# Patient Record
(demographics unavailable — no encounter records)

---

## 2017-01-13 PROBLEM — J32.0 RIGHT MAXILLARY SINUSITIS: Status: ACTIVE | Noted: 2017-01-13

## 2017-01-13 NOTE — PROGRESS NOTES
Tru Watkins is a 79year old female. HPI:   Patient complains of right ear pain for approximately 24-36 hours. She also complains of right maxillary sinus pain. She does complain of head congestion.     Current Outpatient Prescriptions:  Famotidine ( date: 02/15/1984    Smokeless Status: Never Used                        Comment: smoked for 14years     Alcohol Use: Yes                Comment: social  1-2 drinks a week       REVIEW OF SYSTEMS:   GENERAL HEALTH: feels well otherwise  SKIN: denies any unu

## 2017-02-02 NOTE — PROGRESS NOTES
Luis Pepe is a 79year old female. CHIEF COMPLAINT:   Burning with urination  HPI:   Patient presents with symptoms of UTI. Complaining of urinary frequency, urgency, dysuria, suprapubic pain. Onset 7 days ago. Denies back pain, fever, hematuria. GERD    • Esophageal reflux 6/29/2012   • History of blepharoplasty      upper lid w/ excessive skin   • H/O colonoscopy 01/01/10   • Routine Papanicolaou smear 2009   • Pulmonary embolism (HCC)       Social History:    Smoking Status: Former Smoker

## 2017-02-02 NOTE — PATIENT INSTRUCTIONS
Bladder Infection, Female (Adult)    Urine is normally doesn't have any bacteria in it. But bacteria can get into the urinary tract from the skin around the rectum. Or they can travel in the blood from elsewhere in the body.  Once they are in your urinary · Bowel incontinence  · Pregnancy  · Procedures such as having a catheter inserted  · Older age  · Not emptying your bladder. This can allow bacteria a chance to grow in your urine.   · Dehydration  · Constipation  · Sex  · Use of a diaphragm for birth cont · Avoid caffeine, alcohol, and spicy foods. These can irritate your bladder. · Urinate right after intercourse to flush out your bladder. · If you use birth control pills and have frequent bladder infections, discuss it with your doctor.   Follow-up care

## 2017-02-07 NOTE — TELEPHONE ENCOUNTER
Pt called in made an appt to see you Thurs 2/9 at 11am   s/p CTA yesterday, ordered by cards Dr. Ryanne Guaman  Was started on Xarelto d/t findings of acute PE RLL  She wants you to review her CTA for other findings   Thanks.

## 2017-02-09 NOTE — PROGRESS NOTES
Berenice Giles is a 79year old female. HPI:   Patient presents today to discuss results of recent chest CTA. CTA: CONCLUSION:     #1. Acute pulmonary embolus to the right lower lobe. #2. Mild emphysematous changes. #3. Hepatic steatosis.   #4Mel Harkins Furoate 220 MCG/INH Inhalation Aerosol Powder, Breath Activated Inhale 1 puff into the lungs daily.  Disp: 1 Inhaler Rfl: 0      Past Medical History   Diagnosis Date   • BACK PAIN    • CANCER      skin cancer   • GERD    • Esophageal reflux 6/29/2012   • H follow-up provider specified. 1. Pulmonary embolism on right [facility])  PE to right lower lobe of lung per CTA. Discussed CTA results with the patient. Patient to continue Xarelto.   Discussed with patient that she may need lifetime anticoagulation given th

## 2017-03-09 NOTE — TELEPHONE ENCOUNTER
Message left for patient to call our office back. See note below from Dr. Michael Dubose:        \"Please call. Pt on my schedule for PE. Has seen me on 2016 but now seeing Dr. Michelle Antoine for DCIS.  I am happy to see her but Dr. Jose Wolf is hematologist too and I don'

## 2017-03-10 NOTE — PROGRESS NOTES
[facility] Progress Note      Patient Name:  Adam Rios  YOB: 1946  Medical Record Number:  [id number]    Date of visit: 3/10/2017    CHIEF COMPLAINT: recurrent PE.  HPI:     79year old female seen initially at [facility] 2/16/16 aft ALLERGIES:    Propofol                Hallucinations    Comment:EMUL  Vancomycin              Rash, Itching  Adhesive Tape             Ceftin                  Rash    Comment:TABS  Cefuroxime Axetil         Latex                       Comment:Patient s --  Do Not Use - Resp Rate: --  SpO2: --    PS:  ECO    PHYSICAL EXAM:    General: alert and oriented x 3, not in acute distress. HEENT: JEANIE, oropharynx  clear. Neck: supple. No JVD /adenopathy. CVS: S1S2, regular  Rhythm, no murmurs.    Lungs: Cl with her surgeon and will continue with mammograms. Continue rivaroxaban. Absolutely recommended against estrogen replacement. I had discussed it in 5/16 but patient had self resumed it. Is now discontinued it as of 1/17.   Candidate for indefinite an

## 2017-03-10 NOTE — PROGRESS NOTES
Pt here for MD f/u. Recently diagnosed with PE. Currently on Xarelto 20 mg daily. Pt reports DEL RIO and fatigue.      Education Record    Learner:  Patient    Disease / Diagnosis:    Barriers / Limitations:  None   Comments:    Method:  Discussion   Comments:

## 2017-04-06 NOTE — TELEPHONE ENCOUNTER
----- Message from Lindsay Gomez MD sent at 4/5/2017  5:13 PM CDT -----  Call, outside labs received, reviewed and ok

## 2017-04-07 NOTE — PROCEDURES
Luis Pepe is a 80-year-old  female who stands 5 feet 6 inches tall and weighs 147 pounds. She underwent standard pulmonary function testing on 4/6/17. She carries a diagnosis of dyspnea.   She has a 12-pack-year smoking history but stopped

## 2017-04-21 NOTE — PROGRESS NOTES
[facility] Progress Note      Patient Name:  Salvador Seth  YOB: 1946  Medical Record Number:  [id number]    Date of visit: 4/21/2017    CHIEF COMPLAINT: recurrent PE.    HPI:     79year old female seen initially at [facility] 2/16 afte joint pain  Dermatologic: no alopecia, rash, pruritis  : no hematuria, dysuria or frequency  Psych: no confusion or depression   Heme: no easy bruising or bleeding     ALLERGIES:    Propofol                Hallucinations    Comment:EMUL  Vancomycin (LASIX) 20 MG Oral Tab Take 20 mg by mouth See Admin Instructions. Uses 3-4 x a week  Disp:  Rfl:    Metoprolol Succinate (TOPROL XL) 50 MG Oral Tablet SR 24 Hr Take 50 mg by mouth nightly.  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 Subsequent lumpectomy revealed only 1 mm of residual DCIS that was ER/WY positive. Radiation was deferred. She decided against endocrine therapy.   Continue mammograms with surge

## 2017-04-21 NOTE — PROGRESS NOTES
Patient here for 6 week follow up. Patient has recurrent PE and is on xarelto and ecotrin. Patient says she has been SOB for about a year and just had a PFT done from her pulmonologist. Patient also has R Breast CA and is monitored closely with mammograms.

## 2017-05-19 PROBLEM — J40 BRONCHITIS: Status: ACTIVE | Noted: 2017-05-19

## 2017-05-19 NOTE — PROGRESS NOTES
HPI:   Leonardo Chandler is a 79year old female who presents for upper respiratory symptoms for  4  days. Patient reports sore throat only at the beginning of sx's, low grade fever, dry cough.   The patient did exhibit mild emphysema on prior CTA of the ches blepharoplasty      upper lid w/ excessive skin   • H/O colonoscopy 01/01/10   • Routine Papanicolaou smear 2009   • Pulmonary embolism [facility])           Past Surgical History    HYSTERECTOMY      DEXA,BONE DENSITY,AXIAL SKELETON  02/21/09    SINUS SURGERY nourished,in no apparent distress  SKIN: no rashes noted  EYES:conjunctiva are clear  HEENT: Throat appears slightly injected, right TM is normal, left TM is normal, there is PND visible.  There is mild right maxillary sinus tenderness  NECK: supple,no casa

## 2017-05-21 PROBLEM — J44.9 COPD, MILD (HCC): Status: ACTIVE | Noted: 2017-05-21

## 2017-05-26 NOTE — TELEPHONE ENCOUNTER
She should continue her Asmanex inhaler and we can refill the Z-Clint. Have her call next week if she is no better.

## 2017-05-26 NOTE — TELEPHONE ENCOUNTER
Please note below, pt states she is no longer improving and worried symptoms will return, denies fever. Please advise.

## 2017-05-26 NOTE — TELEPHONE ENCOUNTER
pt is still not feeling well she still has a cough she feeling it in her chest what should she do now advised it is a holiday weekend and doc left for an emergency please advise [phone]    Emily Reeder if she should get another antibiotic or wait it out or

## 2017-05-30 NOTE — TELEPHONE ENCOUNTER
Spoke with pt and given recommendations. Pt voiced understanding.   Ok to send Tarah Burnett as directed to her [facility].

## 2017-05-30 NOTE — TELEPHONE ENCOUNTER
Pt informed of Dr. Mary Mcdonnell recommendations and voiced understanding. Zpak refill pended, please approve. Thanks.

## 2017-06-26 PROBLEM — R05.3 PERSISTENT COUGH FOR 3 WEEKS OR LONGER: Status: ACTIVE | Noted: 2017-06-26

## 2017-06-26 NOTE — PROGRESS NOTES
Lorri Adan is a 79year old female. HPI:   Patient is here for follow-up of her recent upper respiratory infection. She has been out of her Asmanex for approximately 3 weeks. She has been having spasmodic coughing.   She does feel short of breath wi Powder, Breath Activated Inhale 1 puff into the lungs daily.  (Patient taking differently: Inhale 1 puff into the lungs daily as needed.  ) Disp: 1 Inhaler Rfl: 0      Past Medical History:   Diagnosis Date   • BACK PAIN    • CANCER     skin cancer   • Esop the lungs 2 (two) times daily. Albuterol Sulfate  (90 Base) MCG/ACT Inhalation Aero Soln 1 Inhaler 4      Sig: Inhale 2 puffs into the lungs every 6 (six) hours as needed for Wheezing.            Imaging & Consults:  None

## 2017-08-11 NOTE — PROCEDURES
[address]       Accredited by the [facility] (AASM)    PATIENT'S NAME:        Winford Osler ANN  ATTENDING PHYSICIAN:   Altaf Sharma M.D.   REFERRING PHYSICIAN:   Altaf Sharma M.D.  PA sedatives prior to sleep.     Dictated By Dominik Garrison M.D.  d: 08/11/2017 14:30:54  t: 08/11/2017 15:23:33  Job [id number]  RN/    cc: Yahir Shankar M.D.

## 2017-10-05 NOTE — TELEPHONE ENCOUNTER
Pt had pneumovax 23 10/17/2012. They are now requesting a pneumonia vaccine. I pended an order for the prevnar vaccine as that is what it looks like they need . Please review.

## 2017-10-05 NOTE — TELEPHONE ENCOUNTER
Please order pheumonia shot for pt coming for flu clinic 10/12 ok'ed at add with flu shot per [facility]

## 2017-10-15 NOTE — ED PROVIDER NOTES
Patient Seen in: [facility]    History   Patient presents with:  Lump Mass (integumentary)    Stated Complaint: RIGHT ARM LUMP/PAIN    HPI    CHIEF COMPLAINT: Bump on the right arm     HISTORY OF PRESENT ILLNESS: Patient is a 70 HYSTERECTOMY  1/6/17: LUMPECTOMY LEFT      Comment: DCIS-Lumpectomy @ [facility]  12/8/16: NEEDLE BIOPSY LEFT      Comment: DCIS  No date: OTHER SURGICAL HISTORY      Comment: birch and sling bladder sx, liposuction and                tummy tuck  No date: OTHER S reviewed  General Appearance: No acute distress  Neurological:  A&Ox3,  Gait normal.  Psychiatric: calm and cooperative  Respiratory: CTAB  Cardiovascular: RRR, S1/S2, no m/c/r  Musculoskeletal: Extremities are symmetrical, full range of motion  Skin:  war

## 2017-10-15 NOTE — ED INITIAL ASSESSMENT (HPI)
Pt noticed a lump to her rt antecubital space this am.  She states it has a dull ache  She got her flu and pneumonia shot 3 days prior

## 2017-10-20 NOTE — PROGRESS NOTES
[facility] Progress Note      Patient Name:  Rashaad Gonzales  YOB: 1946  Medical Record Number:  [id number]    Date of visit: 10/20/2017    CHIEF COMPLAINT: recurrent PE.    HPI:     70year old female seen initially at [address] [place] [address] 2/16 aft easy bruising or bleeding     ALLERGIES:    Vancomycin              Rash, Itching  Adhesive Tape             Ceftin                  Rash    Comment:TABS  Cefuroxime Axetil         Latex                       Comment:Patient states no latex catheters, no l Suspension 2 sprays by Each Nare route daily. Disp: 3 Bottle Rfl: 3   Vitamin Mixture (MICHAEL-C OR) Take 1,000 mg by mouth daily. Disp:  Rfl:    Dicyclomine HCl 10 MG Oral Cap Take 10 mg by mouth as needed.    Disp:  Rfl:    Furosemide (LASIX) 20 MG Oral Tab workup but it would not change her care so she decided to defer at this time. # Dyspnea: Recent diagnosis of sleep apnea. She is beginning to participate in exercise.     # Right breast DCIS status post lumpectomy: 70year old postmenopausal patient wit

## 2017-10-20 NOTE — PROGRESS NOTES
Pt here for 6 month MD f/u for hx of PE & DCIS. Continues on Xarelto daily and is wondering if she should continue taking ASA 81 mg daily as well. Pt due for 6 month mammogram and visit to Dr. Jose E Gant in December.      Education Record    Learner:  Patient

## 2017-10-30 PROBLEM — G47.33 OSA (OBSTRUCTIVE SLEEP APNEA): Status: ACTIVE | Noted: 2017-10-30

## 2017-10-30 NOTE — PROGRESS NOTES
HPI:   Gege Prado is a 70year old female who presents for upper respiratory symptoms for  4  days. Patient reports sore throat, congestion, dry cough, denies fever, denies sinus pain. + sick contact with .       Current Outpatient Prescription Take 1,000 mg by mouth daily. Disp:  Rfl:    Dicyclomine HCl 10 MG Oral Cap Take 10 mg by mouth as needed. Disp:  Rfl:    Furosemide (LASIX) 20 MG Oral Tab Take 20 mg by mouth See Admin Instructions.  Uses 3-4 x a week  Disp:  Rfl:    Metoprolol Succinate Packs/day: 0.00      Years: 0.00         Quit date: 2/15/1984  Smokeless tobacco: Never Used                      Comment: smoked for 14years   Alcohol use:  Yes              Comment: social  1-2 drinks a week        REVIEW OF SYSTEMS:   GENERAL: feels we

## 2017-12-11 PROBLEM — E66.3 OVERWEIGHT: Status: ACTIVE | Noted: 2017-12-11

## 2017-12-11 NOTE — PATIENT INSTRUCTIONS
Martha Mccarty's SCREENING SCHEDULE   Tests on this list are recommended by your physician but may not be covered, or covered at this frequency, by your insurer. Please check with your insurance carrier before scheduling to verify coverage.    PREVENTATI aneurysm screening (once between ages 73-68) IPPE only No results found for this or any previous visit.  Limited to patients who meet one of the following criteria:   • Men who are 73-68 years old and have smoked more than 100 cigarettes in their lifetime needed after [address] due on 05/17/2018 Please get this Mammogram regularly   Immunizations      Influenza  Covered Annually   Orders placed or performed in visit on 09/28/15  -FLU VACC PRSV FREE INC ANTIG   Orders placed or performed in visit on for anyone to review and print using their home computer and printer. (the forms are also available in [address])  [email or web address]  This link also has information from the [address] [place] [address] regarding Advance Directives.

## 2017-12-11 NOTE — PROGRESS NOTES
HPI:   Ramon Del Castillo is a 70year old female who presents for a MA (Medicare Advantage) [facility] (Once per calendar year). Patient is here for her Medicare super visit. She states she has been doing fairly well. Her main complaint is fatigue.   Kavitha Nolasco mild (HCC)     BERNIE (obstructive sleep apnea)     Overweight    Wt Readings from Last 3 Encounters:  12/11/17 : 152 lb  10/30/17 : 157 lb  10/30/17 : 157 lb     Last Cholesterol Labs:     Lab Results  Component Value Date   CHOLEST 146 12/14/2017   HDL 37 ( Take by mouth. Glucos-Chond-Hyal Ac-Ca Fructo (MOVE FREE JOINT HEALTH ADVANCE) Oral Tab Take by mouth. Fluticasone Propionate 50 MCG/ACT Nasal Suspension 2 sprays by Each Nare route daily. Vitamin Mixture (MICHAEL-C OR) Take 1,000 mg by mouth daily. exertion  GI: denies abdominal pain, denies heartburn  : denies dysuria, vaginal discharge or itching, no complaint of urinary incontinence   MUSCULOSKELETAL: denies back pain  NEURO: denies headaches  PSYCHE: denies depression or anxiety  HEMATOLOGIC: d Neurologic: Normal         Vaccination History   Immunization History   Administered Date(s) Administered   • Flulaval 0.5 ml 6 months & older (26962) 10/12/2017   • HIGH DOSE FLU 65 YRS AND OLDER PRSV FREE SINGLE D (91464) FLU CLINIC 09/28/2015   • Infl No  Has your appetite been poor?: No  How does the patient maintain a good energy level?: Other (2 days at health club)  How would you describe your daily physical activity?: Moderate  How would you describe your current health state?: Good  How do you [place] this patient. Update Health Maintenance if applicable    Chlamydia  Annually if high risk No results found for: CHLAMYDIA No flowsheet data found.     Screening Mammogram      Mammogram Annually to 76, then as discussed Mammogram,1 Yr due on 12/11/2018 Margaux Spirometry Testing Annually Spirometry date: 04/07/2017 No flowsheet data found.             Template: [facility] MARGUERITE MEDICARE ANNUAL ASSESSMENT FEMALE [79832]

## 2017-12-13 NOTE — TELEPHONE ENCOUNTER
Incoming fax received from [facility] Prescription Drug coverage determination for Dicyclomine HCL. PA/forms not faxed per patient request.  patient requests Prior Authorization faxed or sent to insurance 1/2018.   Patient will contact office concernin

## 2017-12-17 PROBLEM — R05.3 PERSISTENT COUGH FOR 3 WEEKS OR LONGER: Status: RESOLVED | Noted: 2017-06-26 | Resolved: 2017-12-17

## 2017-12-17 PROBLEM — J32.0 RIGHT MAXILLARY SINUSITIS: Status: RESOLVED | Noted: 2017-01-13 | Resolved: 2017-12-17

## 2017-12-17 PROBLEM — J40 BRONCHITIS: Status: RESOLVED | Noted: 2017-05-19 | Resolved: 2017-12-17

## 2017-12-21 NOTE — PROGRESS NOTES
Abnormal labs    Patient is here to discuss her recent labs.   Glucose was elevated mildly.  The hemoglobin A1c came in at 6.3 which puts her in the borderline diabetes category.  Her lipid panel was acceptable.  CMP was remarkable for a slightly elevated c

## 2018-02-23 PROBLEM — R06.03 ACUTE RESPIRATORY DISTRESS: Status: ACTIVE | Noted: 2018-02-23

## 2018-02-23 PROBLEM — I26.99 RECURRENT PULMONARY EMBOLISM (HCC): Status: ACTIVE | Noted: 2018-02-23

## 2018-02-23 NOTE — PROGRESS NOTES
HPI:   Shiva Crawford is a 70year old female who presents for upper respiratory symptoms for  1  days. Patient reports sore throat, low grade fever, bodyaches.       Current Outpatient Prescriptions:  sucralfate 1 GM/10ML Oral Suspension Take 10 mL (1 g t H/O colonoscopy 01/01/10   • History of blepharoplasty     upper lid w/ excessive skin   • Obstructive apnea 08/02/2017    Edward HST AHI 7.4 autoPAP 6-16 Prism   • Pulmonary embolism ([facility] [place] [address]    • Routine Papanicolaou smear 2009      Past Surgical History: abdominal pain  NEURO: denies headaches    EXAM:   /60   Pulse 72   Temp 98.3 °F (36.8 °C)   Resp 16   Wt 155 lb   LMP 09/29/1996   BMI 25.02 kg/m²   GENERAL: well developed, well nourished,in no apparent distress  SKIN: no rashes noted  EYES:conjunc

## 2018-02-26 NOTE — TELEPHONE ENCOUNTER
Call to pt from flu/rsv test results-see result notes. She mentions this phone call. Advised of dr Shikha Sánchez comments/recommendations noted below. Reinforced calling with update by end of wk-no later than Friday morning (3/2) if no better.    Patient v

## 2018-02-26 NOTE — TELEPHONE ENCOUNTER
Call was transferred to triage. Pt stated that she never received her result for her Flu test.  Advised pt of negative finding. Pt voiced understanding.   Pt requested recommendations on what she can do for her cough, it has gone from a dry cough to a pro

## 2018-02-26 NOTE — TELEPHONE ENCOUNTER
She can take Mucinex DM for the cough. If need be she can also use saline nasal spray. Have her call she feels she is no better by the end of the week.

## 2018-03-26 NOTE — PROGRESS NOTES
Mera Shaver is a 70year old female. HPI:   Patient presents today reporting a sharp right ear pain x 5 days. She reports that today, it is slightly improved but still intense at times. When the pain occurs she rates it as a \"9.8/10\" .  She reports t Glucos-Chond-Hyal Ac-Ca Fructo (MOVE FREE JOINT HEALTH ADVANCE) Oral Tab Take by mouth. Disp:  Rfl:    Fluticasone Propionate 50 MCG/ACT Nasal Suspension 2 sprays by Each Nare route daily.  (Patient taking differently: 2 sprays by Each Nare route 2 (two) apparent distress  HEENT: TM clear bilaterally-small amount of chano cerumen noted to right ear canal-non-obstructing of right TM. Bilateral nasal turbinates are erythematous and edematous. Clear nasal congestion is noted.  Throat is mildly erythematous- PN patient indicates understanding of these issues and agrees to the plan. The patient is asked to return if symptoms worsen or fail to improve.  Patient is asked to call the office in 3 days and provide an update on her symptoms-call sooner if any worsening s

## 2018-04-11 NOTE — PROGRESS NOTES
Laya Medina is a 70year old female. HPI:   Patient presents with symptoms of UTI. Complaining of urinary frequency, urgency, dysuria, suprapubic pain for the past few days. Denies back pain, fever, hematuria. States she gets about one UTI per year. hours as needed for Wheezing. Disp: 1 Inhaler Rfl: 4   Cholecalciferol (VITAMIN D3 SUPER STRENGTH) 2000 units Oral Tab Take 2,000 Units by mouth.  Disp:  Rfl:       Past Medical History:   Diagnosis Date   • BACK PAIN    • CANCER     skin cancer   • Esophag DAILY. Disp: 90 tablet Rfl: 0   Fluticasone Furoate-Vilanterol (BREO ELLIPTA) 100-25 MCG/INH Inhalation Aerosol Powder, Breath Activated Inhale 1 puff into the lungs daily.  Disp: 3 each Rfl: 3   Pantoprazole Sodium 40 MG Oral Tab EC Take 1 tablet (40 mg to symptoms.

## 2018-04-20 NOTE — PROCEDURES
[address]       Accredited by the [facility] of [facility] (AASM)    PATIENT'S NAME:        Francesca CURRY  ATTENDING PHYSICIAN:   Hong Amezcua M.D.   REFERRING PHYSICIAN:   NOEMÍ Neely apnea-hypopnea index is 1.1. Oxygen veronique is 88%. PERIODIC LIMB MOVEMENTS:  PLM index is 18 with a PLM arousal index of only 4. EEG:  With the limited recording montage, no EEG abnormalities were detected. EKG: The EKG demonstrates sinus rhythm.

## 2018-05-18 NOTE — PROGRESS NOTES
Regina Robles is a 70year old female. HPI:   Patient is here to discuss her recent labs from 4/30/18 and 5/2/18. Her lipid panel was remarkable for a low HDL and elevated triglyceride level. We discussed the importance of diet and exercise.   Her hemo (Patient taking differently: 2 sprays by Each Nare route 2 (two) times daily as needed.  ) Disp: 3 Bottle Rfl: 3   Furosemide (LASIX) 20 MG Oral Tab Take 20 mg by mouth See Admin Instructions.  Uses 3-4 x a week  Disp:  Rfl:    Metoprolol Succinate (TOPROL

## 2018-06-01 NOTE — TELEPHONE ENCOUNTER
Patient reporting this morning gross noticeable hematuria. Looked like someone mixed tablespoon of blood into urine. Does have some discomfort. Asking for appointment. She also reports being on Xarelto. Told her needs ER evaluation for further testing.  Ins

## 2018-06-01 NOTE — ED PROVIDER NOTES
Patient Seen in: [facility] Emergency Department    History   Patient presents with:  Urinary Symptoms (urologic)    Stated Complaint: hematuria    HPI    51-year-old female presents to the emergency department with complaints of hematuria.   Patient HYSTERECTOMY        Smoking status: Former Smoker                                                              Packs/day: 0.00      Years: 0.00         Quit date: 2/15/1984  Smokeless tobacco: Never Used                      Comment: smoked for 14years   A and vitals reviewed.            ED Course     Labs Reviewed   URINALYSIS WITH CULTURE REFLEX - Abnormal; Notable for the following:        Result Value    Urine Color Red (*)     Clarity Urine Cloudy (*)     Blood Urine Large (*)     Protein Urine 100  (*) Script, Disp-20 tablet, R-0    Phenazopyridine HCl 100 MG Oral Tab  Take 1 tablet (100 mg total) by mouth 3 (three) times daily as needed for Pain., Print Script, Disp-6 tablet, R-0

## 2018-06-01 NOTE — ED INITIAL ASSESSMENT (HPI)
Patient arrives c/o blood in urine, passing clots or what looks like tissue intermittently. On xarelto.  Also reports pain/burning with urination

## 2018-06-08 NOTE — PROGRESS NOTES
Jf Ospina is a 70year old female. HPI:   Patient is a 51-year-old female who has had a history recently of multiple urinary infections.   She states now that she is having an internal burning which is different than the pain that she had with her ur Nare route 2 (two) times daily as needed.  ) Disp: 3 Bottle Rfl: 3   Furosemide (LASIX) 20 MG Oral Tab Take 20 mg by mouth See Admin Instructions.  Uses 3-4 x a week  Disp:  Rfl:    Metoprolol Succinate (TOPROL XL) 50 MG Oral Tablet SR 24 Hr Take 50 mg by m ordered in this encounter    Imaging & Consults:  None  The patient would like to see a uro-gynecologist at [facility] since that is where she had her surgery. At her last visit she saw a Dr. Jaye Phillips.   She will give her a call to set up an appointment as

## 2018-07-25 PROBLEM — R14.0 ABDOMINAL BLOATING: Status: ACTIVE | Noted: 2018-07-25

## 2018-09-12 NOTE — TELEPHONE ENCOUNTER
Patient called regarding a Yellow Fever vaccination, her and her  are traveling to [facility] in Feb 2019 and the [facility] has told them they do not have Yellow Fever Vaccine available and so has [employer].  She would [employer]

## 2018-09-12 NOTE — TELEPHONE ENCOUNTER
reed SUAREZ recommends pt confirm w entity that provides yellow fever vaccine whether there is any interaction between or duration of time to wait between the 2 vaccines.      Call to pt-advised travel clinics are usually our first recommendation for kelechi

## 2018-09-19 NOTE — TELEPHONE ENCOUNTER
reed [facility] received info indicating another pt received yellow fever vaccine (stamaril)  thru [address] [place] [address] location locally and throughout the [address] does not indicate which location had vaccine available but [employer] loca

## 2018-10-17 NOTE — TELEPHONE ENCOUNTER
Pt transferred to nurse. She reports having shots last weds for upcoming travels through [place]: Hep A, typhoid, yellow fever. She said she developed some sx's Monday and is wondering if they are related? Reports today feeling fine today.   Monday had

## 2018-10-29 NOTE — PROGRESS NOTES
Pt was seen today for a flu shot and a TDap. Pt came in and reported that she has a rash on both lower legs that is \" itchy and stingy\". Rash did not look red, skin was clear of any open wounds or any drainage. No fever reported.   Spoke with Dr Yuri Gauthier

## 2018-10-30 NOTE — TELEPHONE ENCOUNTER
Pt was seen in the office yesterday for immunization    She c/o of rash in her legs for 2 days  Scattered red rash in bilat leg  Been taking benadryl and OTC hydrocortisone cream  She might have been bitten by an unknown insect? (not sure about it)       S

## 2018-12-31 NOTE — TELEPHONE ENCOUNTER
Pt has physical 1/4/19 and would like to have blood work placed to do ahead of the appt. Please advise. Pt would also like to be called when orders are placed.

## 2019-01-02 NOTE — TELEPHONE ENCOUNTER
Lab orders placed. Call to pt's cell reaches identified voice mail. Per hipaa consent, left vmm advising requested fasting orders have been placed. Advised to call back if any questions re this info.

## 2019-01-04 NOTE — PROGRESS NOTES
HPI:   Rashaad Gonzales is a 67year old female who presents for a MA (Medicare Advantage) [facility] (Once per calendar year). Patient is here for her Medicare super visit. She states she has been doing fairly well.   She does have a history of multip Lakesha Yu MD as PCP - General (Family Practice)  Jude Lopez MD (GASTROENTEROLOGY)    Patient Active Problem List:     Esophageal reflux     Pure hyperglyceridemia     Fear of flying     Frequent PVCs     Personal history of pulmonary embolism     Hyper before meals and nightly. Dicyclomine HCl 10 MG Oral Cap Take 1 capsule (10 mg total) by mouth as needed.    [DISCONTINUED] Fluticasone Furoate-Vilanterol (BREO ELLIPTA) 100-25 MCG/INH Inhalation Aerosol Powder, Breath Activated Inhale 1 puff into the [employer] Disease in her mother; Dementia in her mother; Diabetes in her sister; Heart Disorder in her mother; Other in her father and mother; partkinson's disease in her father. SOCIAL HISTORY:   She  reports that she quit smoking about 34 years ago.  Her smoking and gums normal   Neck: Supple, symmetrical, trachea midline, no adenopathy;  thyroid: not enlarged, symmetric, no tenderness/mass/nodules; no carotid bruit or JVD   Back:   Symmetric, no curvature, ROM normal, no CVA tenderness   Lungs:   Clear to auscult neoplasm of left female breast, unspecified estrogen receptor status, unspecified site of breast ([facility] [address]  Continue follow-up with oncology    7. Pure hyperglyceridemia  Continue fenofibrate 145 mg nightly  - COMP METABOLIC PANEL (14);  Future  - LIPID PANEL; Date Value   07/14/2014 117 (H)          Cardiovascular Disease Screening     LDL Annually LDL-CHOLESTEROL (mg/dL (calc))   Date Value   06/02/2011 76     LDL CHOLESTROL (mg/dL)   Date Value   10/21/2013 80     LDL Cholesterol (mg/dL)   Date Value   01/0 VIII or IX concentrates   Clients of institutions for the mentally retarded   Persons who live in the same house as a HepB virus carrier   Homosexual men   Illicit injectable drug abusers     Tetanus Toxoid  Only covered with a cut with metal- TD and TDaP

## 2019-01-10 PROBLEM — R06.03 ACUTE RESPIRATORY DISTRESS: Status: RESOLVED | Noted: 2018-02-23 | Resolved: 2019-01-10

## 2019-01-10 PROBLEM — C50.912 MALIGNANT NEOPLASM OF LEFT FEMALE BREAST (HCC): Status: ACTIVE | Noted: 2019-01-10

## 2019-01-10 NOTE — PATIENT INSTRUCTIONS
Diogo Mccarty's SCREENING SCHEDULE   Tests on this list are recommended by your physician but may not be covered, or covered at this frequency, by your insurer. Please check with your insurance carrier before scheduling to verify coverage.    PREVENTATI aneurysm screening (once between ages 73-68) IPPE only No results found for this or any previous visit.  Limited to patients who meet one of the following criteria:   • Men who are 73-68 years old and have smoked more than 100 cigarettes in their lifetime and as needed after 75 Mammogram due on 12/12/2019 Please get this Mammogram regularly   Immunizations      Influenza  Covered Annually Orders placed or performed in visit on 09/28/15   • FLU VACC PRSV FREE INC ANTIG   Orders placed or performed in visit o different types of Advance Directives. It also has the State forms available on it's website for anyone to review and print using their home computer and printer. (the forms are also available in [address])  www. [email or web address]. org  This link also has informa

## 2019-01-11 NOTE — TELEPHONE ENCOUNTER
Called pt LVM that Dr. Nuno Lim is out of network.  Advised pt she will needs to contact ins company to obtain a list of in network general surgeons so we can work on a new referral.

## 2019-01-11 NOTE — TELEPHONE ENCOUNTER
received message from referrals department below.      Type Date User Summary Attachment   General 01/11/2019  8:56 AM María Miranda Auto: Referral message -   Note    ----- Message -----  From: Tiffani Valle  Sent: 1/11/2019   8:55 AM  To: Emg 11

## 2019-01-28 NOTE — TELEPHONE ENCOUNTER
Pt called. Pt stated that the pharmacy called in a refill on Fenofibrate and she is correct. Pt says that she needs this right away. Dr Landen Devine prescribed it before and now he's not in the office until Thursday so she can't wait until that long.  She needs S D

## 2019-02-11 NOTE — TELEPHONE ENCOUNTER
1. What are your symptoms? Fever 101 and Cough and diarrhea Patient is [place] right now will be back tommorow    2. How long have you been having these symptoms? 3 days     3. Have you done anything already to treat your symptoms?      Tylenol    ADD

## 2019-02-11 NOTE — TELEPHONE ENCOUNTER
Call to pt-sts has had \"cold symptoms, cough, chest congestion/tightness and fever to 101 since last wk and constant diarrhea since we've been here. \"  sts she is due to fly home [facility].  Recommended evaluation now in her current location in

## 2019-02-12 NOTE — TELEPHONE ENCOUNTER
Call to pt's cell now reaches identified voice mail. Per hipaa consent, left vmm advising of multiple attempts to call her back yesterday w advice from dr. Latrice Zamora of dr champion's recommendations and explained rationales.  Advised to call back/speak w tr

## 2019-02-12 NOTE — TELEPHONE ENCOUNTER
Madhu Kicks calling very upset that he has been \"holding for 20 minutes and I'm calling from [facility] and my call fees are high\", also angry he got a call back from us with no voice mail\".  Nursing staff and front staff confirm he was holding a sh

## 2019-02-14 NOTE — PROGRESS NOTES
HPI:   Jaclyn Garcia is a 67year old female who presents for upper respiratory symptoms for  3  days.  Patient reports congestion, yellow colored nasal discharge, fever with Tmax to 101, cough with yellow colored sputum, cough is keeping pt up at night, sh (LASIX) 20 MG Oral Tab Take 20 mg by mouth See Admin Instructions.  Uses 3-4 x a week  Disp:  Rfl:       Past Medical History:   Diagnosis Date   • BACK PAIN    • [facility] 01/09/2017    DCIS   • C. difficile diarrhea 6/1/2015   • CANCER     skin tobacco: Never Used      Tobacco comment: smoked for 14years     Alcohol use: Yes      Comment: social  1-2 drinks a week    Drug use: No        REVIEW OF SYSTEMS:   GENERAL: feels well otherwise  SKIN: no rashes  EYES:denies blurred vision or double visio

## 2019-02-15 NOTE — PROGRESS NOTES
Jordyn Michelle is a 67year old female. HPI:   Patient presents today reporting bug bites to the back of her bilateral thighs. She reports she noticed this approximately 5 days ago when she was in [facility] [place] [place]   Patient reports the bug bites are \"very itchy \ ROUTE DAILY. Disp: 3 Bottle Rfl: 3   sucralfate 1 g Oral Tab Take 1 g by mouth 4 (four) times daily before meals and nightly. Disp:  Rfl:    Dicyclomine HCl 10 MG Oral Cap Take 1 capsule (10 mg total) by mouth as needed.  Disp: 30 capsule Rfl: 0   Cholecalc joint aches or pains.   EXAM:   /62   Pulse 76   Temp 98.2 °F (36.8 °C) (Oral)   Resp 18   Ht 65\"   Wt 153 lb   LMP 09/29/1996   BMI 25.46 kg/m²   GENERAL: well developed, well nourished,in no apparent distress  SKIN: Scattered mildly erythematous pa understanding of these issues and agrees to the plan. The patient is asked to return if symptoms worsen or fail to improve--patient to call with update in 3 days--sooner if any new/worsening symptoms.

## 2019-07-29 NOTE — PROGRESS NOTES
Laura Thorpe is a 67year old female. HPI:   Patient is here to discuss her recent labs from 4/30/18 and 5/2/18. Her lipid panel was remarkable for a low HDL and elevated triglyceride level. We discussed the importance of diet and exercise.   Her hemo Metoprolol Succinate (TOPROL XL) 50 MG Oral Tablet SR 24 Hr Take 50 mg by mouth nightly. Disp:  Rfl:    FENOFIBRATE 145 MG Oral Tab TAKE 1 TABLET (145 MG TOTAL) BY MOUTH ONCE DAILY.  Disp: 90 tablet Rfl: 1   Fluticasone Furoate-Vilanterol (BREO ELLIPTA) 1 atraumatic. Neck: Normal range of motion. Neck supple. Cardiovascular: Normal rate and regular rhythm. Pulmonary/Chest: Effort normal and breath sounds normal.   Neurological: She is alert and oriented to person, place, and time.    Psychiatric: She

## 2019-08-19 NOTE — TELEPHONE ENCOUNTER
Pt called. She stats she talked to [facility] [employer] last week. Her Vit D is really low. She's been taking 2000 IU in pill form. She's wondering if she should switch to drops (1000 IU) and also take her pill of 2000 IU so that she makes a 3000 IU.  She's also wo

## 2019-08-20 NOTE — TELEPHONE ENCOUNTER
Call to pt-advised of dr champion's recommendation noted below. Reinforced repeat vit d level in 6 months. Patient voices understanding/agrees with plan/no further questions. Medication record updated.

## 2019-08-20 NOTE — TELEPHONE ENCOUNTER
Call back from pt-sts only wants to do otc vitamin d from a specific mfgr (tiffanie-jarrod?).'  sts their vitamin d only comes in 2,000 IU gelcaps or 1,000 IU drops. sts r o'zahra recommending 3,000 IU daily.  Does not want to do combination of gelcaps and d

## 2019-09-23 NOTE — PROGRESS NOTES
Kami Hirsch is a 67year old female. HPI:   Patient presents today reporting a dysuria, urinary frequency, cloudy urine for the past 10 days. She reports this morning she felt low back pain-but this has since resolved. She denies any hematuria.  She ha Soln Inhale 2 puffs into the lungs every 6 (six) hours as needed for Wheezing. Disp: 3 Inhaler Rfl: 1   Methenamine Hippurate 1 g Oral Tab Take 1 g by mouth every morning.  Disp:  Rfl:    Dicyclomine HCl 10 MG Oral Cap Take 1 capsule (10 mg total) by mouth BMI 25.79 kg/m²   GENERAL: well developed, well nourished,in no apparent distress  HEENT: TM clear bilaterally, nose no congestion, throat clear no erythema without mass.    EYES: PERRLA, EOM intact, sclera clear without injection  NECK: supple,no adenopat

## 2019-10-23 NOTE — TELEPHONE ENCOUNTER
Patient stopped in office and signed medical records release form. Going to see a neurologist at [facility] and requesting any records that may be relating to her tremor. Printed last year worth of OV notes, labs, med list, imaging results and immunizations.

## 2019-11-26 PROBLEM — N18.30 CKD (CHRONIC KIDNEY DISEASE) STAGE 3, GFR 30-59 ML/MIN (HCC): Chronic | Status: ACTIVE | Noted: 2019-11-26

## 2019-11-27 NOTE — TELEPHONE ENCOUNTER
Confirmed with NM that Darra Fudge was ordered to replace Nuclear Exercise test    The pt was able to complete the Radha Farrell today as well    [facility]

## 2019-11-27 NOTE — TELEPHONE ENCOUNTER
Pt called back received a call from our office by Sussy Srinivasan    Pt at the moment supposed to do nuclear stress test but on hold , since recommending to change test to lexiscan, since having issue with her heart rate    Confirmed with Dino Alvarenga that pt was ca

## 2019-11-29 NOTE — TELEPHONE ENCOUNTER
From: Pepe Lowery  To: Maria Garza MD  Sent: 11/27/2019 1:02 PM CST  Subject: Test Results Question    Dr Chanel Bennett,   I did finish the stress test this morning by switching over to the [facility] test when the [facility] wasn’t getting my heart r

## 2019-12-01 NOTE — PROGRESS NOTES
Lorri Adan is a 68year old female. HPI:   Patient is a 80-year-old female who gives initial complaint of dysuria for approximately 24 hours. She denies fever. She denies outright abdominal pain. She believes she has a bladder infection.   While in morning. • Dicyclomine HCl 10 MG Oral Cap Take 1 capsule (10 mg total) by mouth as needed. 30 capsule 0   • Potassium Chloride ER 10 MEQ Oral Tab CR Take 10 mEq by mouth as needed.        • XARELTO 20 MG Oral Tab 20 mg nightly  1   • Glucos-Chond-Hyal A adenopathy,no bruits  LUNGS: clear to auscultation  CARDIO: RRR without murmur  GI: good BS's,no masses, HSM or tenderness  EXTREMITIES: no cyanosis, clubbing or edema    ASSESSMENT AND PLAN:   1. Shortness of breath at rest  Stress thallium test    2.  Cristal Rick

## 2019-12-02 NOTE — TELEPHONE ENCOUNTER
Pt received the results of her stress thallium on my chart. It was nornal. She wants to know what is next, since she still has the same symptoms. Still w/ palpitations. She  sees  Dr. Giovani Tang, electrophysiologist but does not feel he can help her.  She think

## 2019-12-02 NOTE — TELEPHONE ENCOUNTER
Pt  called back with the fax number for this doctor.     Fax: [phone]    Back up fax: [phone]

## 2019-12-02 NOTE — TELEPHONE ENCOUNTER
Pt notified to call Dr. Yoli Reed and make an appt to discuss her continued heart fluttering per Dr. Sethi Artist. Pt agreed to plan and verbalized understanding.

## 2019-12-04 NOTE — TELEPHONE ENCOUNTER
Call from [facility] Speak  Pt has appt sudheer   Requesting for EKG copy  Fax to [phone]-hmyd

## 2019-12-23 NOTE — PROGRESS NOTES
Roseline Bahena is a 68year old female. HPI:   Patient presents today reporting a 4 day history of productive cough--yellow phlegm, chest congestion, coughing spasms, occasional shortness of breath. Noticed some wheezing last night.  She reports sinus con mouth as needed. • XARELTO 20 MG Oral Tab 20 mg nightly  1   • Glucos-Chond-Hyal Ac-Ca Fructo (MOVE FREE JOINT HEALTH ADVANCE) Oral Tab Take by mouth. • Furosemide (LASIX) 20 MG Oral Tab Take 20 mg by mouth See Admin Instructions.  Uses 3-4 x a we palpation. EYES: PERRLA, EOM intact, sclera clear without injection  NECK: supple,no adenopathy, thyroid normal to palpation  LUNGS: crackles to left lower lobe. Good air movement.   CARDIO: RRR without murmur  Neurological: nerves II through XII grossly i

## 2019-12-27 NOTE — PROGRESS NOTES
Rashaad Gonzales is a 68year old female. HPI:   Patient presents today for a follow up. Patient reports the past 1-2 days has been wheezing more- on exhale. Occasional shortness of breath.  Notes chest tightness when she has coughing spasms but denies any Dicyclomine HCl 10 MG Oral Cap Take 1 capsule (10 mg total) by mouth as needed. 30 capsule 0   • Potassium Chloride ER 10 MEQ Oral Tab CR Take 10 mEq by mouth as needed.        • XARELTO 20 MG Oral Tab 20 mg nightly  1   • Glucos-Chond-Hyal Ac-Ca Fructo (MO EOM intact, sclera clear without injection  NECK: supple,no adenopathy  LUNGS: coarse expiratory wheezing to auscultation. Previously noted left lower lobe crackles resolved. Patient received albuterol nebulizer treatment in office- patient tolerated well.

## 2020-01-03 NOTE — PROGRESS NOTES
Laura Thorpe is a 68year old female. HPI:   Patient states she overall feels improved. She does not feel however that she is 100%. She had temporarily stopped her Breo Ellipta while she was using albuterol.   She did not realize she could use them brandy on 1/3/2020 ) 30 capsule 0      Past Medical History:   Diagnosis Date   • BACK PAIN    • [facility]    DCIS   • C. difficile diarrhea 6/1/2015   • CANCER     skin cancer   • Esophageal reflux 6/29/2012   • GERD    • H/O colonoscopy 01/ Fluticasone Furoate-Vilanterol (BREO ELLIPTA) 100-25 MCG/INH Inhalation Aerosol Powder, Breath Activated 3 each 3     Sig: Inhale 1 puff into the lungs daily.        Imaging & Consults:  None  Patient to update me in 5 to 7 days as to her progress

## 2020-01-14 NOTE — TELEPHONE ENCOUNTER
Please call pt to schedule their annual MA supervisit. Please let Kelsi Barrios know the date once it is schedule.   Thanks

## 2020-01-20 NOTE — TELEPHONE ENCOUNTER
OV notes from late December for pneumonia /wheezing and 1/3/20 for cough, mild COPD reviewed. At 1/3 OV dr Vicente Domingo advised calling w update in 5-7 days-no calls received. Also recommended return if symptoms worsen or fail to improve.     Call to pt-repor

## 2020-01-20 NOTE — TELEPHONE ENCOUNTER
Pt states that she is still having wheezing and coughing more then 10 times a day with the bronchitis. Please advise pt.  LOV was 1/3/20

## 2020-01-26 NOTE — PROGRESS NOTES
Adam Rios is a 68year old female. HPI:   Patient states she has a persistent cough over the past week. She has noted some chest tightness along with periodic wheezing. She has had sinus drainage and discomfort below her right eye.   She also has d 3-4 x a week      • Metoprolol Succinate (TOPROL XL) 50 MG Oral Tablet SR 24 Hr Take 50 mg by mouth nightly.         Past Medical History:   Diagnosis Date   • BACK PAIN    • [facility]    DCIS   • C. difficile diarrhea 6/1/2015   • CANC Signed Prescriptions Disp Refills   • Amoxicillin-Pot Clavulanate 875-125 MG Oral Tab 20 tablet 0     Sig: Take 1 tablet by mouth 2 (two) times daily for 10 days.        Imaging & Consults:  None

## 2020-02-11 NOTE — PATIENT INSTRUCTIONS
Renee Mccarty's SCREENING SCHEDULE   Tests on this list are recommended by your physician but may not be covered, or covered at this frequency, by your insurer. Please check with your insurance carrier before scheduling to verify coverage.    PREVENTATI aneurysm screening (once between ages 73-68) IPPE only No results found for this or any previous visit.  Limited to patients who meet one of the following criteria:   • Men who are 73-68 years old and have smoked more than 100 cigarettes in their lifetime and as needed after 75 Mammogram due on 12/16/2020 Please get this Mammogram regularly   Immunizations      Influenza  Covered Annually Orders placed or performed in visit on 09/28/15   • FLU VACC PRSV FREE INC ANTIG   Orders placed or performed in visit o different types of Advance Directives. It also has the State forms available on it's website for anyone to review and print using their home computer and printer. (the forms are also available in [place]  [email or web address]  This link also has informa

## 2020-02-11 NOTE — PROGRESS NOTES
HPI:   Arturo Lorenzo is a 68year old female who presents for a MA (Medicare Advantage) [facility] (Once per calendar year). Patient is here for her Medicare super visit. She states she has been doing fairly well.   She does have a history of multip at low risk.     Patient Care Team: Patient Care Team:  Juanito Esteban MD as PCP - [facility])  Janelle Mojica MD (GASTROENTEROLOGY)    Patient Active Problem List:     Esophageal reflux     Pure hyperglyceridemia     Fear of flying or Shortness of Breath. Pantoprazole Sodium 40 MG Oral Tab EC, Take 1 tablet (40 mg total) by mouth 2 (two) times daily before meals. Fluticasone Propionate 50 MCG/ACT Nasal Suspension, 2 sprays by Each Nare route daily.   Cholecalciferol 2000 units Oral Dementia in her mother; Diabetes in her sister; Heart Disorder in her mother; Other in her father and mother; partkinson's disease in her father. SOCIAL HISTORY:   She  reports that she quit smoking about 36 years ago.  Her smoking use included cigarettes Tolerate Visual Acuity: Yes      General Appearance:  Alert, cooperative, no distress, appears stated age   Head:  Normocephalic, without obvious abnormality, atraumatic   Eyes:  PERRL, conjunctiva/corneas clear, EOM's intact both eyes   Ears:  Normal TM's presents for a Medicare Assessment. PLAN SUMMARY:   Diagnoses and all orders for this visit:    1. Encounter for annual health examination  See above    2.  Pure hyperglyceridemia  Continue fenofibrate 145 mg 1 tablet daily  - COMP METABOLIC PANEL (14); separate sheet to patient  PREVENTATIVE SERVICES  INDICATIONS AND SCHEDULE Internal Lab or Procedure External Lab or Procedure   Diabetes Screening      HbgA1C   Annually Lab Results   Component Value Date    A1C 6.3 (H) 08/07/2019    No flowsheet data fou Pneumococcal 13 (Prevnar)  Covered Once after 65 10/12/2017 Please get once after your 65th birthday    Pneumococcal 23 (Pneumovax)  Covered Once after 65 10/17/2012 Please get once after your 65th birthday    Hepatitis B for Moderate/High Risk No vacci

## 2020-02-20 NOTE — TELEPHONE ENCOUNTER
LOV- 2/11/20  Last refill for Albuterol 12/27/19    Are you OK filling this request?   Last refill for Zoloft 7/18/19 by another provider ([id number], RF# [id number])    Rx pended   Thank you

## 2020-02-20 NOTE — TELEPHONE ENCOUNTER
Pt states that she ran out of Albuterol Sulfate  (90 Base). Pt wants to know if Dr. Anthony Reddy wants her to continue his medication. If so, wants prescription sent to [facility] in chart.      Pt is also running out of Sertraline HCl 50 MG Oral Tab and wants to know

## 2020-03-05 NOTE — TELEPHONE ENCOUNTER
Received letter of determination from [employer], pt's Albuterol Sulfate HFA is approved 12/30/2019-12/31/2020. There is no record of a PA being initiated.

## 2020-03-25 NOTE — PROGRESS NOTES
Leonardo Chandler is a 68year old female. HPI:   Patient presents today to discuss 2 complaints. 1. Patient reports rash to her vaginal area- reports rash is red, itchy and some \"stinging\" sensation from time to time. No drainage.  She reports that she Cholecalciferol 2000 units Oral Cap Take 4,000 Int'l Units by mouth daily. • Methenamine Hippurate 1 g Oral Tab Take 1 g by mouth every morning. • Dicyclomine HCl 10 MG Oral Cap Take 1 capsule (10 mg total) by mouth as needed.  30 capsule 0   • Pota developed, well nourished,in no apparent distress  SKIN: dry skin noted to bilateral eyelids- no erythema, no swelling, no drainage. HEENT: TM clear bilaterally, nose no congestion, throat clear no erythema without mass.    EYES: PERRLA, EOM intact, scler laterality  Discussed using thick barrier cream such as Aquaphor. No s/s of infection. Can continue compresses as needed. Monitor. The patient indicates understanding of these issues and agrees to the plan.   The patient is asked to return if symptoms

## 2020-04-01 NOTE — PROGRESS NOTES
Virtual/Telephone Check-In    Pamella Escalante verbally consents to a Virtual/Telephone Check-In service on 04/01/20. Patient understands and accepts financial responsibility for any deductible, co-insurance and/or co-pays associated with this service.

## 2020-04-02 NOTE — TELEPHONE ENCOUNTER
Pt states ins denied both creams that were called in yesterday.(did not know names) She would like to know what to do. Please advise. Thank you.

## 2020-04-02 NOTE — TELEPHONE ENCOUNTER
Only one combo medication was sent in yesterday, Nystatin-triamcinolone 814667-9.1 unit cream.  Called [facility] pharmacy, spoke with radha Simpson.   She stated that the insurance wont cover the combo cream, but if sent as two separate creams that it should be cov

## 2020-06-03 NOTE — TELEPHONE ENCOUNTER
1. What are your symptoms? Sob low temp eye lids itchy    2. How long have you been having these symptoms? 2 weeks     3. Have you done anything already to treat your symptoms?      inhalers  ADDITIONAL INFO:

## 2020-06-03 NOTE — TELEPHONE ENCOUNTER
See note below, pt states she has a hx of SOB, denies wheezing, SOB alittle worse in last 2 weeks, inhaler working well itchy eyes improving and has been treated from [facility], temp in morning 97.1 and 99.6 in evening,  Pt wants to know if she ne

## 2020-06-04 NOTE — TELEPHONE ENCOUNTER
Called patient to discuss urgent message from earlier today. She states that she has been having dyspnea on exertion for some time. She does believe it has been worse over the past 10 days.   She also has been noticing low-grade fevers particularly at nig

## 2020-06-04 NOTE — ED PROVIDER NOTES
Patient Seen in: [facility] Emergency Department      History   Patient presents with:  Dyspnea GLENN SOB    Stated Complaint: SOB ~ reports x2 years, has had multiple tests w/ no diagnosis but states has g*    HPI    22-year-old female comes to the h SURGICAL HISTORY      birch and sling bladder sx, liposuction and tummy tuck   • OTHER SURGICAL HISTORY      esophagogastroduodenoscopy   • OTHER SURGICAL HISTORY  01/01/1992    nasal septal deviation repair   • OTHER SURGICAL HISTORY  06/13    vaginal lif Notable for the following components:       Result Value    BUN 21 (*)     BUN/CREA Ratio 26.9 (*)     Calcium, Total 10.2 (*)     A/G Ratio 0.9 (*)     All other components within normal limits   TROPONIN I - Normal   PRO BETA NATRIURETIC PEPTIDE - Normal Hafsa Nichols MD on 6/04/2020 at 12:24 PM          Patient was observed in the department. The symptoms are the same that she has been dealing with for the past 2 years without change.   I see no changes in her work-up at this time is felt she can be saf

## 2020-06-22 NOTE — TELEPHONE ENCOUNTER
Pt submitted a mychart notification stating no longer taking sertraline due to med causing tremors. Record shows med last ordered 2/20/2020 #30 no refills. Call to pt reaches identified voice mail.  Left [facility] req call back to provide update re stop sadia

## 2020-06-22 NOTE — TELEPHONE ENCOUNTER
Pt called back and stated \" I have not taken the sertraline in over 3-4 weeks. It was discontinued for having tremors. \"

## 2020-08-18 NOTE — TELEPHONE ENCOUNTER
Call from gabby/\"pharmacist w pt's [employer] medicare part d plan\"-sts calling to review pt's most recent medication list.  Reviewed meds listed in pt's 3/25/2020 [facility] APRN OV notes but advised of pt's 6/22/20 call info she stopped sertraline 3-4 wks khurram

## 2020-09-02 NOTE — TELEPHONE ENCOUNTER
S/w pt. I let her know Dr Sergio Lay advises pt's end of sept, early October to obtain flu shots. She voiced understanding.

## 2020-09-15 NOTE — TELEPHONE ENCOUNTER
Pt.s spouse sent a my chart message inquiring if there are any immunizations they are missing. I reviewed the chart and it looks like she needs an additional Hep A vaccine, Hepatitis B vaccines and shingrix. Please review.

## 2020-09-25 NOTE — ED PROVIDER NOTES
Patient Seen in: [facility] Emergency Department      History   Patient presents with:  Difficulty Breathing    Stated Complaint: sob, cough to r/o covid    HPI    12-year-old with past medical history of pulmonary embolism on Xarelto presents today SURGICAL HISTORY  01/01/1992    nasal septal deviation repair   • OTHER SURGICAL HISTORY  06/13    vaginal lift    • OTHER SURGICAL HISTORY  07/13    bladder sling    • SINUS SURGERY       • TOTAL ABDOM HYSTERECTOMY     • VAGINAL HYSTERECTOMY warm.   Neurological:      General: No focal deficit present. Mental Status: She is alert. Cranial Nerves: No cranial nerve deficit. Sensory: No sensory deficit. Motor: No weakness.       Coordination: Coordination normal.   Psychiatric: CORONAVIRUS SARS-COV-2 BY PCR([facility])   CBC W/ DIFFERENTIAL     EKG    Rate, intervals and axes as noted on EKG Report.   Rate: 83  Rhythm: Sinus Rhythm  Reading: no stemi, ant lateral st depression similar to prior ecg              Xr Chest Ap Portable  (cpt disposition, and through shared decision making with patient, given her reassuring work-up/evaluation, patient would like to be discharged at this time to further monitor her symptoms at home.   Will discharge patient with pulse ox so she can continue to mo

## 2020-09-25 NOTE — RESPIRATORY THERAPY NOTE
Patient given Incentive Spirometry device with education and instructions. No questions at this time. Performed well.

## 2020-09-25 NOTE — ED INITIAL ASSESSMENT (HPI)
Patient reports low grade fever since yesterday afternoon, associated with ongoing shortness of breath. Slight cough as well. Admits to diarrhea intermittently for past week.

## 2020-09-25 NOTE — TELEPHONE ENCOUNTER
What symptoms is the patient experiencing?: feever 100.4 dry cough, more SOB then usual ,fatigue,pt feels very hot, runny nose,coughing,chills,back pain       Has the patient had ANY travel within the last 30 days (domestic or international - please list) - IF YES, SEND TO TRIAGE? NO    Has the patient had contact with any person who has confirmed Covid-19?     NO

## 2020-09-28 NOTE — TELEPHONE ENCOUNTER
Pt was seen in the ER 9/25   Dx URI, pending confirmatory covid (rapid test was negative)    Still with mild cough, No SOB  No fever  But still having diarrhea for 5 days, 5-6X \"fair amount\"  Last episode this am, \"some form today\"  No abdl pain, eating    Pt booked for teleV follow up lukasz at 4     But she requested if you can talk to her today    Pls advise   Thank you

## 2020-09-29 NOTE — TELEPHONE ENCOUNTER
I called pt and told her she is released from quarantine as of today. Pt agreed to plan and verbalized understanding.

## 2020-09-29 NOTE — TELEPHONE ENCOUNTER
Discussed with patient. She is gradually feeling better. No fever x3 days. I did tell her to isolate herself until she gets her second COVID test back. We will cancel her appointment for tomorrow.

## 2020-09-29 NOTE — TELEPHONE ENCOUNTER
Pt called she received the results of her second COVID test and it is negative. She is feeling much better. She has no fever, diarrhea has improved. She has no other symptoms. She would like to go to a luncheon on Thursday.  When can she be released from [facility]

## 2020-12-23 NOTE — ED PROVIDER NOTES
Patient Seen in: [facility]      History   Patient presents with:  Urinary Symptoms    Stated Complaint: UTI/KIDNEY PAIN     HPI    CHIEF COMPLAINT: Dysuria     HISTORY OF PRESENT ILLNESS: Patient is a pleasant 51-year-old female who pres SKELETON  02/21/09   • EACH ADD TOOTH EXTRACTION  5/16/2016   • ESOPHAGEAL MANOMETRY N/A 1/2/2017    Performed by Daniella Alvarez MD at [facility] ENDOSCOPY   • ESOPHAGOGASTRODUODENOSCOPY WITH DILITATION N/A 10/31/2019    Performed by Daniella Alvarez MD at [facility] and cooperative  Respiratory: CTAB  Cardiovascular: RRR, S1/S2, no m/c/r  Musculoskeletal: Extremities are symmetrical, full range of motion  Skin:  warm and dry, no rashes. Abdomen: Soft, nontender/nondistended. No guarding or rebound.   No CVA tenderne List as of 12/23/2020  5:02 PM    START taking these medications    Nitrofurantoin Monohyd Macro 100 MG Oral Cap  Take 1 capsule (100 mg total) by mouth 2 (two) times daily for 5 days. , Normal, Disp-10 capsule, R-0    Phenazopyridine HCl 200 MG Oral Tab  T

## 2020-12-23 NOTE — ED INITIAL ASSESSMENT (HPI)
C/o urinary symptoms-sharp burning sensation started 40 minutes prior to arrival. Denies abdominal/back pain.

## 2020-12-27 NOTE — ED NOTES
Call placed to pt regarding no growth urine culture result. Pt prescribed Macrobid.   (Pt to complete and see PCP for follow up)

## 2020-12-27 NOTE — ED NOTES
Pt returned our call and was notified about ucul result. Advised complete Macrobid and follow up with PCP. Pt verbalizes understanding. No further questions.

## 2020-12-28 NOTE — PROGRESS NOTES
Salvador Seth is a 76year old female.   HPI:   Patient is a 40-year-old female who had been seen at the urgent care on 12/23/2020 for evaluation of dysuria that started 2 hours prior to arrival.  She had a history of recurrent urinary tract infections an 10 mEq by mouth as needed. • Furosemide (LASIX) 20 MG Oral Tab Take 20 mg by mouth See Admin Instructions.  Uses 3-4 x a week         Past Medical History:   Diagnosis Date   • BACK PAIN    • [facility]    DCIS   • C. difficile daniel diagnosis)    Orders Placed This Encounter      UA/M With Culture Reflex [E]      Symptomatic COVID-19 Testing by PCR () [E]      Meds & Refills for this Visit:  Requested Prescriptions     Signed Prescriptions Disp Refills   • Amoxicillin-Pot Lainey Santiago

## 2021-04-11 NOTE — PROGRESS NOTES
Right arm pain    Patient is a 77-year-old female who complains of right upper arm and shoulder pain. She states it does radiate down into her wrist, hand and fingers. She has not noticed any difference in her strength.   Pain does increase with certain m

## 2021-05-03 PROBLEM — C50.912 MALIGNANT NEOPLASM OF LEFT FEMALE BREAST (HCC): Status: RESOLVED | Noted: 2019-01-10 | Resolved: 2021-05-03

## 2021-05-03 PROBLEM — I26.99 RECURRENT PULMONARY EMBOLISM (HCC): Status: RESOLVED | Noted: 2018-02-23 | Resolved: 2021-05-03

## 2021-05-03 PROBLEM — R14.0 ABDOMINAL BLOATING: Status: RESOLVED | Noted: 2018-07-25 | Resolved: 2021-05-03

## 2021-05-03 NOTE — PATIENT INSTRUCTIONS
Salvador Mccarty's SCREENING SCHEDULE   Tests on this list are recommended by your physician but may not be covered, or covered at this frequency, by your insurer. Please check with your insurance carrier before scheduling to verify coverage.    PREVENTATI aneurysm screening (once between ages 73-68) IPPE only No results found for this or any previous visit.  Limited to patients who meet one of the following criteria:   • Men who are 73-68 years old and have smoked more than 100 cigarettes in their lifetime and as needed after 75 Mammogram due on 12/17/2021 Please get this Mammogram regularly   Immunizations      Influenza  Covered Annually Orders placed or performed in visit on 09/28/15   • FLU VACC PRSV FREE INC ANTIG   Orders placed or performed in visit o different types of Advance Directives. It also has the State forms available on it's website for anyone to review and print using their home computer and printer. (the forms are also available in [address])  [email or web address]  This link also has informa

## 2021-05-03 NOTE — PROGRESS NOTES
HPI:   Mera Shaver is a 76year old female who presents for a MA (Medicare Advantage) [facility] (Once per calendar year). Patient is here for her Medicare super visit. She states she has been doing fairly well.   She does have a history of multip of pulmonary embolism     COPD, mild (HCC)     BERNIE (obstructive sleep apnea)     Overweight     Mixed incontinence     Female genital prolapse     Personal history of other malignant neoplasm of skin    Wt Readings from Last 3 Encounters:  05/03/21 : 163 l nightly  Glucos-Chond-Hyal Ac-Ca Fructo (MOVE FREE JOINT HEALTH ADVANCE) Oral Tab, Take by mouth. Furosemide (LASIX) 20 MG Oral Tab, Take 20 mg by mouth See Admin Instructions.  Uses 3-4 x a week   Metoprolol Succinate (TOPROL XL) 50 MG Oral Tablet SR 24 H OF SYSTEMS:   GENERAL: feels well otherwise  SKIN: denies any unusual skin lesions  EYES: denies blurred vision or double vision  HEENT: denies nasal congestion, sinus pain or ST  LUNGS: denies shortness of breath with exertion  CARDIOVASCULAR: denies ches no carotid bruit or JVD   Back:   Symmetric, no curvature, ROM normal, no CVA tenderness   Lungs:   Clear to auscultation bilaterally, respirations unlabored   Heart:  Regular rate and rhythm, S1 and S2 normal, no murmur, rub, or gallop   Abdomen:   Soft, mEq take with furosemide  - COMP METABOLIC PANEL (14); Future  - LIPID PANEL; Future    3. Palpitations  Continue metoprolol succinate 50 mg 1 tablet nightly  - COMP METABOLIC PANEL (14); Future    4.  Hypertriglyceridemia  Continue fenofibrate 145 mg 1 tab GLUCOSE (mg/dL)   Date Value   07/14/2014 117 (H)          Cardiovascular Disease Screening     LDL Annually LDL Cholesterol (mg/dL)   Date Value   02/20/2020 80     LDL-CHOLESTEROL (mg/dL (calc))   Date Value   06/02/2011 76     LDL CHOLESTROL (mg/dL) received Factor VIII or IX concentrates   Clients of institutions for the mentally retarded   Persons who live in the same house as a HepB virus carrier   Homosexual men   Illicit injectable drug abusers     Tetanus Toxoid  Only covered with a cut with met

## 2021-05-04 NOTE — TELEPHONE ENCOUNTER
Fax from pharmacy requesting clarification on potassium sig: how many times a day? Updated rx sent to pharmacy.

## 2021-07-06 NOTE — PROGRESS NOTES
HPI/Subjective:   Park Schneider is a 76year old female who presents for Test Results (labs from 06/28/2021)     Patient is here to discuss her labs from 6/28/2021. Most significantly her glucose is elevated and her hemoglobin A1c has risen to 6.5.   I d Metoprolol Succinate (TOPROL XL) 50 MG Oral Tablet SR 24 Hr Take 50 mg by mouth nightly. Review of Systems:  Pertinent items are noted in HPI.       Objective:   /62   Pulse 64   Temp 99.2 °F (37.3 °C)   Resp 16   Ht 5' 6\" (1.676 m)   Wt 163

## 2021-07-09 NOTE — TELEPHONE ENCOUNTER
Patient states she was told last office visit she will need to see a dietitian due to her lab results.  Patient said she has been waiting for referral .

## 2021-07-09 NOTE — TELEPHONE ENCOUNTER
Pt wanting a recommendation for a dietician per her office visit with you on 07/06/2021. I reviewed her labs and pended an order for her to diabetic education. Please authorize if this is correct.  I pended the 3 hour diabetic nutrition management since she

## 2021-07-12 NOTE — TELEPHONE ENCOUNTER
[Your physician has referred you to the [facility] for Medical Nutrition Therapy.     To schedule your appointment at either our [place] [facility] or [place] location call [phone].]    Debbie Speaks and MyChart message sent--see message

## 2021-07-15 NOTE — PROGRESS NOTES
Laya Medina   9/25/1946 was seen for Diabetic Medical Nutrition Therapy:    7/15/2021  Referring Provider: Dr. Marissa Melara  Start time: 1:45 End time: 2:45    HgbA1C (%)   Date Value   06/28/2021 6.5 (H)     Pt just recently converted to T2DB.  Has gained

## 2021-07-26 NOTE — TELEPHONE ENCOUNTER
Last ov 7.6 for new onset DM  Per 6.28 labs:    Potassium   3.5 - 5.1 mmol/L 4.1     Takes with lasix.

## 2021-08-17 NOTE — TELEPHONE ENCOUNTER
1. What are your symptoms?   Pt believes she has a sinus infection    Morning temp is 97.8-98 but wakes up sweaty  Temp 98.8-99 in the afternoon   When sleeping/laying down fever goes down  Sore throat Monday but gone now  \"Light dry cough\"  Drainage down

## 2021-08-17 NOTE — TELEPHONE ENCOUNTER
Pt notified rx of Augmentin was sent to her pharmacy. If she is no better after completion of the abx. She will need to be seen.  Pt. Agreed to plan and verbalized understanding

## 2021-08-17 NOTE — TELEPHONE ENCOUNTER
T.C. to pt. She has a slight dry cough, PND, nasal congestion and her right side of her face is tender to touch. She denies fever. No body aches and no chills. She has had sx x 1 week. She feels as though this is her normal signs of a sinus infection.  Sh

## 2021-08-26 NOTE — TELEPHONE ENCOUNTER
Call to pt. Fall on 08/21/21.--->[facility] ED (See note below)  Sts she lost balance while getting out of a car, hit right side of head on cement. Denies any current dizziness, headache, pain, confusion, or bleeding.      Sts is tender and swollen, b

## 2021-08-26 NOTE — TELEPHONE ENCOUNTER
Pt calling to inform Dr. Chase Screws that she fell this past Saturday and was taken to [facility]. She slit an arterial vein in the fall, and has 3 stitches about 2\" above her right ear.  Pt CT scan showed no blood clots or blood on the brain, and pt st

## 2021-08-30 NOTE — PROGRESS NOTES
HPI/Subjective:   Mera Shaver is a 76year old female who presents for ER F/U (8/21 NW after fall, reports she lost balance while getting out of a car, hit right side of head on cement, no LOC, side of head still hurts but not worse)     History as abo Suspension 2 sprays by Each Nare route daily. (Patient taking differently: 2 sprays by Each Nare route daily. Bid ) 3 Bottle 3   • Cholecalciferol 2000 units Oral Cap Take 4,000 Int'l Units by mouth daily.      • Methenamine Hippurate 1 g Oral Tab Take 1 g

## 2021-09-02 NOTE — TELEPHONE ENCOUNTER
The patient's high calcium level has been noted for several years. We worked it up in 2018.   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

## 2021-09-02 NOTE — TELEPHONE ENCOUNTER
Elliot Garzon, [place] 9/1/2021 1:14 PM CDT      ----- Message -----  From: Kirsty Hewitt  Sent: 9/1/2021 10:07 AM CDT  To: [facility] Clinical Staff  Subject: Visit Follow-up Question     Sorry, I can't remember if we covered the area about my high calci

## 2021-09-27 NOTE — PROGRESS NOTES
CONCUSSION EVALUATION:   Referring Physician: Dr. Liliana Cho  Diagnosis: Traumatic injury of head, subsequent encounter (S09.90XD)        Date of Service: 9/27/2021     PATIENT SUMMARY:    Regina Robles is a 76year old female who presents to therapy t with Noni Valerio. Significant findings include PE, COPD, borderline DM, skin cancer    ASSESSMENT-   Noni Valerio presents to physical therapy evaluation following a fall and concussion 4 weeks ago.  Pt's primary complaint is decreased balance noted especially with walkin treatment plan, expectations, and prognosis.  Pt was also provided recommendations for activity modifications, possible soreness after evaluation, modalities as needed [ice/heat], postural corrections, importance of remaining active and strategies to reduce To:12/26/2021

## 2021-10-04 NOTE — PROGRESS NOTES
Dx: Traumatic injury of head, subsequent encounter (S09.90XD)            Insurance (Authorized # of Visits):  5           Authorizing Physician: Dr. Cynthia Elam  Next MD visit: none scheduled  Fall Risk: standard         Precautions: COPD, fall risk Progress to slow head turns. gastroc stretch   .   10/4 doorway stretch      Charges: TEx2 NR       Total Timed Treatment: 45 min  Total Treatment Time: 45 min

## 2021-10-11 NOTE — PROGRESS NOTES
Dx: Traumatic injury of head, subsequent encounter (S09.90XD)            Insurance (Authorized # of Visits):  5           Authorizing Physician: Dr. Regena Duverney  Next MD visit: none scheduled  Fall Risk: standard         Precautions: COPD, fall risk corner balance Rhomberg with support as needed. Progress to slow head turns. gastroc stretch   .   10/4 doorway stretch      Charges: TEx1 NR  x2     Total Timed Treatment: 45 min  Total Treatment Time: 45 min

## 2021-10-18 NOTE — PROGRESS NOTES
Dx: Traumatic injury of head, subsequent encounter (S09.90XD)            Insurance (Authorized # of Visits):  5           Authorizing Physician: Dr. Slim Loya  Next MD visit: none scheduled  Fall Risk: standard         Precautions: COPD, fall risk foam  Rocker boars AP/ML 20x each  Corner with support:  Semi tandem L/R  BOSU alt mini lunge 15x each  Modified lat squat walk 10'x4  High knee 10x  Standing squat hip hinge - facing chairs 10x                     HEP:  painfree gentle cervical rot, stand

## 2021-10-25 NOTE — PROGRESS NOTES
Dx: Traumatic injury of head, subsequent encounter (S09.90XD)            Insurance (Authorized # of Visits):  5           Authorizing Physician: Dr. Franco Villafuerte  Next MD visit: none scheduled  Fall Risk: standard         Precautions: COPD, fall risk balance Rhomberg -- head turns 10x  Pt ed balance options at gym -Elijah Number awareness.  Use of foam  Rocker boars AP/ML 20x each  Corner with support:  Semi tandem L/R  BOSU alt mini lunge 15x each  Modified lat squat walk 10'x4  High knee 10x  Standing squat

## 2021-11-01 NOTE — PROGRESS NOTES
Dx: Traumatic injury of head, subsequent encounter (S09.90XD)            Insurance (Authorized # of Visits):  5           Authorizing Physician: Dr. Azael Luciano  Next MD visit: none scheduled  Fall Risk: standard         Precautions: COPD, fall risk NR  Balance staggered stance L/R  Rhomberg EC 30\" goal x3  Firm rhomberg head turns 10x NR  TRX  Squat 10x  Row  10x  Balance training  -SLS on TRX  6\" alt taps  BOSU mini lunge with UE support 10x2   NR  Hallway walk quick stop, 180 deg turn, head tur

## 2021-11-08 NOTE — PROGRESS NOTES
Dx: Traumatic injury of head, subsequent encounter (S09.90XD)            Insurance (Authorized # of Visits):  5           Authorizing Physician: Dr. Starkey Led  Next MD visit: none scheduled  Fall Risk: standard         Precautions: COPD, fall risk L3    Doorway stretch 3x  Facing wall thoracic ext stretch with ball 10x  3 way stretch 4 point    NR  Balance staggered stance L/R  Rhomberg EC 30\" goal x3  Firm rhomberg head turns 10x NR  TRX  Squat 10x  Row  10x  Balance training  -SLS on TRX  6\" alt

## 2021-11-15 NOTE — PROGRESS NOTES
Dx: Traumatic injury of head, subsequent encounter (S09.90XD)            Insurance (Authorized # of Visits):  5           Authorizing Physician: Dr. Nura Lowry  Next MD visit: none scheduled  Fall Risk: standard         Precautions: COPD, fall risk L3    Doorway stretch 3x  Facing wall thoracic ext stretch with ball 10x  3 way stretch 4 point TE  Nustep 5'  Doorway corner stretch 3x30\"  HEP review answering pt questions  Supine yoga twist L/R   NR  Balance staggered stance L/R  Rhomberg EC 30\" goal tuck, bridge, side hip abd. Corner balance for safety march, mini lat lunge.  STS  11  11/8 4 point bird dog, 3 way prayer stretch, SB DKTC, SB bridge, SB wall walk thoracic ext      Charges: TEx1 NR  x1 man x 1 Total Timed Treatment: 45 min  Total Treatmen

## 2021-11-29 NOTE — PROGRESS NOTES
Dx: Traumatic injury of head, subsequent encounter (S09.90XD)            Insurance (Authorized # of Visits):  5           Authorizing Physician: Dr. Ilana Palafox  Next MD visit: none scheduled  Fall Risk: standard         Precautions: COPD, fall risk TX#: 3/9 Date:  10/18/21               TX#: 4/9 Date:   10/25/21              TX#: 5/9 Date: 11/1/21  Tx#: 6/9 11/9/21  7/9 11/15/21  8/9 11/29/21  9/9   Nustep 5'  Pt ed gymr guidelines/.precautions  Runner's calf stretch 2x30\" each   Corner stretch LE ext with pillow addition vs 4 point LE ext. 5x each     NR  Up/down flight stepsx2  Hallway walk with head turns, 180 deg turn.  3'  Standing squat hip hinge 10x4 point  TrAb brace train  4 point LE ext 3x --> UE/LE progression 5x each  SB instructed DK

## 2022-01-19 NOTE — TELEPHONE ENCOUNTER
Pt updating Dr Sunita Chavez that she is Covid positive. States she started having symptoms around 1/12 - 1/13. Pt had a rapid and PCR test completed on 1/14,  Rapid results came back in 15 minutes stating pt was negative. Pt received PCR results yesterday 1/18 stating Covid positive. Symptoms:    Fatigue, slight cough, temperature around 97.? States throughout day temperature raises and reaches about 99.2 - 99.4 end of day. Pt takes tylenol before bed and goes away. Pt would like to speak with a nurse to discuss some questions she has regarding Covid. A few questions pt has are listed below: \"How long will I be contagious? \" \"When did I start being contagious? \" \"How long are symptoms estimated to stay around? \"    Please advise

## 2022-01-20 NOTE — TELEPHONE ENCOUNTER
Pt is expecting a call back from Dr. Gaudencio Villa. She states she has been waiting for him to call all day. Pt states she has some questions she would like to ask him. She is concerned, because she never expected to contract Covid. Advised pt that Dr. Gaudencio Villa was currently seeing pts. Pt requesting a call back from a nurse, and/or Dr. Gaudencio Villa.

## 2022-03-14 NOTE — TELEPHONE ENCOUNTER
S/w Damien DE LEON. Informed of below symptoms and pts appt today. Damien DE LEON agreeable to see pt. S/w Daniella Vee. Confirmed appt.

## 2022-03-14 NOTE — TELEPHONE ENCOUNTER
Pt has been having pain in her right leg for the past 5 days. Pt states in in her upper thigh, about 5 inches from the junction with her torso. The pain comes in sharp waves. Pt wants to see Dr. Parveen Parada; reluctantly accepted an appt at 10:30 w/S. Dressler. Pt requesting call back as soon as possible.

## 2022-03-14 NOTE — TELEPHONE ENCOUNTER
S/w [place] Castellani. Onset: ~5 days ago     Works with a   Saw him on 3/8, pain began on 3/10  Intermittent sharp, throbbing pain, midline of right anterior thigh   Reports there is warmth and swelling  Intermittent tingling, radiates throughout right leg to toes. No numbness. Denies redness, pallor, bruising, or fever    Pain is worse with prolonged period of sitting/laying down    Management: Tylenol     Please advise if any recommendations.  Pt made an appt for today at 10:30 AM

## 2022-05-09 NOTE — TELEPHONE ENCOUNTER
Call back from pt-advised of info noted below from dr Michelle Talbert. Pt sts is using fluticasone nasal spray bid. Advised to contact ENT ofc and update clinical staff w current symptoms, let them know she spoke w PCP ofc and dr Michelle Talbert requesting further recommendations from ENT. Pt confirms has seen dr Aspen Dominguez in the past, has [place] speaking w his ofc also over the past month, voices understanding, agrees w plan, no questions, sts will call his ofc now to update and obtain further recommendations. Voices fear that only other option will be surgery-\"really don't want to do or have time to do that. \" emotional support given.

## 2022-05-09 NOTE — TELEPHONE ENCOUNTER
Pt called this am to give an update on her sx. She took 500 mg of tylenol last night at 7:15 pm. Her temp this am is 98.5. she denies any abdominal pain, UTI sx, cough, HA or ST. She has been under the care of ENT for a chronic sinus issue. She has been on an abx. and steroid. She does feel better bit is still having some swelling in her nostrils that go from side to side. I spoke with Dr. Parveen Parada. He wants to know If pt is on a steroid nasal spray?

## 2022-05-09 NOTE — TELEPHONE ENCOUNTER
05/09 LMTCB  Is pt on a steroid nasal spray? If no, start fluticasone nasal spray 2 sprays each nostril daily and call ENT to update them with Sx. If she is using a nasal spray then call ENT with update on sx.

## 2022-05-09 NOTE — TELEPHONE ENCOUNTER
Patient paged on call provider reporting temp of 101.3 F-cannot take Tylenol due to elevated LFT's and repeating labs this week and cannot take NSAIDs due to being on Xarelto. Reports feeling uncomfortable due to temperature. Denies cough, shortness of breath, abdominal pain, chest pain, urinary symptoms. Reports chronic sinusitis- seeing ENT for this and recently completed course of steroid and antibiotic. I advised patient she can take (1) dose of Tylenol- 500 mg now, use cool compresses, push fluids. She has a home rapid COVID test that she will take and if positive will page me otherwise will update office in AM.    TRIAGE- Please call pt for update on symptoms.

## 2022-08-17 NOTE — TELEPHONE ENCOUNTER
Initial call info does not state who her \"usual\" ENT is. Record shows pt saw dr Mia Mcdowell 4/28/22 and was advised to follow up in 3 wks. No other appts w him seen other than she has appt w him 8/22/22. Also date of current appt w dr Zuñiga Holding not provided. Call to pt's cell reaches identified voice mail. Per hipaa consent, left vmm advising need more info re her call today, our ofc is now closed for the day, req call back to triage nurse  tomorrow to discuss further.  Provided out ofc #/phone hours

## 2022-08-17 NOTE — TELEPHONE ENCOUNTER
Pt calling in regards to a ENT specialist that she needs assistance with. Pt did state that she had went to see her usual ENT doctor in regards to her left nostril being completely closed off. And she was told that she needs surgery to handle this issue and that they will follow up with her, and he has not been able to give her any updates, nobody has returned her calls or anything and she has decided to move forward with another ENT that her  currently sees who is also a plastic surgeon. Pt states that she is having trouble breathing, and has been waiting 5 weeks just for a consultation from the current ENT and cant take it any longer. Pt states that she does have sleep apnea and her Cpap machine is no longer working because her left nostril is completely blocked off and she cant get the air through the nostril. Pt was wondering if Dr Pia García could possibly reach out to the new ENT Dr Julieth Barrow who works out of [facility] and possibly get her a sooner appt or even a referral that states that she needs to be seen sooner than she currently is. Pt provided Dr Ray Batter number   Ph: [phone]  Fax:[phone]      Pt requesting call back from triage nurse updating her on what Dr Gaudencio Villa has responded.      Please advise

## 2022-08-18 NOTE — TELEPHONE ENCOUNTER
Pt called back. She has an appt with ENT Dr. Valeria Ugarte at [facility] on 09/20/2022. It is for a consult regarding the chronic left nasal passage being blocked. Pt was told by their office if her PCP called they may be able to get her in sooner. I called and spoke to [facility] at Dr. Shawnee Corona office. She is going to check Dr. Estelle Mccullough schedule and call pt today regarding a sooner appt. Pt was notified of this and verbalized understanding.

## 2022-08-22 NOTE — TELEPHONE ENCOUNTER
Fax rec'd from Dr An Malcolm  Pt needs medical clearance for surgery 9.23.22 at [facility] , LEFT MAXILLARY ANTROSTOMY WITH REMOVAL OF TISSUE, LEFT TOTAL RTHMOIDECTOMY ,LEFT SPHENOIDOTOMY    Fax is not clear if any testing is needed prior. I will call them in am-closed for the day.

## 2022-08-23 NOTE — TELEPHONE ENCOUNTER
I called Dr Ni Phillips [facility]. They gave me # for Karena Scherer who generated fax [phone]  I s/w her. She confirms pt will need a bmp and EKG d/t being over 65  Orders placed. Fax given to phone room to book pt.

## 2022-08-24 NOTE — PROGRESS NOTES
Called pt about schedule an appt for her pre-surgical clearance visit. Gave pt paper to  to get her an office visit rt away before seeing Rio Garza on 9-21-22

## 2022-08-24 NOTE — TELEPHONE ENCOUNTER
Pt called looking to schedule pre op. Pt states she is no longer having Dr. Lindsay Molina perform her surgery. Pt was made aware that we have not received any paperwork from the new surgeon.

## 2022-09-09 NOTE — ED INITIAL ASSESSMENT (HPI)
Recently, pt was placed on Levaquin for a sinus infection. Took med for 7 days and finished 2 days ago. Patient knew she had a possible allergy to Levaquin. C/o cramps/muscle tightness in left leg with hot red spots. Pt has swelling in both legs, L>R. Gets sob but states that is her norm. Patient is on a blood thinner.

## 2022-09-09 NOTE — TELEPHONE ENCOUNTER
Call to pt's cell. Reaches identified VM. Per HIPAA consent, LVM requesting call back to triage nurse today to discuss her symptoms. Provided call back number and ofc phone hours.

## 2022-09-09 NOTE — TELEPHONE ENCOUNTER
Pt calling due to an issue with medication she was taking. Pt states that she had a scheduled sinus surgery, and was given medication to clear the bacteria from her sinuses before the surgery. Pt states that she had been taking this medication for 7 days and has been off this medication for 2.5 days now and is having severe pain in her left leg. Pt states that her legs are swollen and are hot to the touch and patchy. It seems as though the pt could be having an allergic reaction, and would like a call back from a triage nurse for the next steps to take with this matter.      Please advise

## 2022-09-09 NOTE — TELEPHONE ENCOUNTER
Was taking Levaquin x7 days prescribed by Ly Kent  Completed course on 9/6/22  Sts she was allergic to this med ~30 years ago when she took it      Onset of left leg symptoms: 9/5/22--had blotchy red patches that were warm to the touch    blotchy patches have resolved  No redness  Denies warmth   Slight aches \"in some spots\" such as lateral side of calf, pain to palpation behind knee   General aches in this leg  Has edema in bilateral legs but feels left leg is more swollen  Denies chest pain  Reports SOB at baseline but SOB has not worsened since onset     She is on xarelto  D/t symptoms reported and hx of dvt, advised pt to proceed to UC for evaluation.   Provided with address to EDW SAWYER MOLINA  She is agreeable, will be going now    [facility]

## 2022-09-12 NOTE — TELEPHONE ENCOUNTER
Pt called back. She was in the  on 09/09 with leg pain and swelling. She had a B/L venous doppler of B/L lower extremities and it was negative for any evidence of a DVT. Pt has a follow up with Dr. Christiane Bedolla tomorrow 09/13 at 1:30 PM. I got an update on how she is doing. Pt stated her left leg is doing much better. The redness is much better. The pain is gone and the swelling in the left leg is significantly improved. The right leg has only swelling but it has improved. I informed Daniella Vee I would send the update to Dr. Christiane Bedolla and call her back with any new recommendations. I instructed her to keep her appt. for tomorrow and call if there is any worsening symptoms or concerns. Pt agreed to plan and verbalized understanding.

## 2022-09-12 NOTE — TELEPHONE ENCOUNTER
Pt called to schedule IC follow up apt for swelling in legs. See TE from 9/9 for additional details, pt experiencing ongoing swelling/reddness/warmpth in left leg. Pt was seen at [address] [place] [address] on 9/9, was advised to follow up with PCP, no prescriptions given. Apt scheduled for follow up with Dr Anne-Marie Starks on 9/13, pt wonders if possible to be seen sooner or to speak with Dr Anne-Marie Starks on the phone today? Pt is looking for guidance as to what she should do while waiting for appointment as she was not provided any advise from IC. Routing to triage for possible additional evaluation. Routing to Dr Anne-Marie Starks to determine if sooner apt should be scheduled? Please advise?

## 2022-09-22 NOTE — TELEPHONE ENCOUNTER
From: Mayra Rosales  To: Devendra Zarate MD  Sent: 9/22/2022 2:34 PM CDT  Subject: Xarelto    I had thought that the reason I was taking Xarelto had something to do with the a-fib I had a few years ago. Since I haven't had an episode in a few years, I asked Dr. Rina Lundborg if I needed to take it anymore. From his perspective, I do not. But as part of his reply he said that he thinks the reason Xarelto was started stemmed from the fact that I had clots in my legs and lungs and that I should check with you as to whether I need to continue with Xarelto or not. Please provide your thoughts as I have a few pills left and need to make a decision on refilling prescription .  Thanks, Marques Mccarthy

## 2022-09-22 NOTE — TELEPHONE ENCOUNTER
I know Brianda Kirkpatrick has been in the office recently but I would like her to come back in to discuss Xarelto specifically and to figure out whether we could stop it

## 2022-09-29 NOTE — PROGRESS NOTES
I discussed anticoagulation with her hematologist.  It is okay for her to come off of Xarelto.   If she has any another thrombotic event she will need to stay on anticoagulation indefinitely

## 2022-11-14 NOTE — TELEPHONE ENCOUNTER
Call back from pt-advised of dr ivey's instructions noted below, informed albuterol order sent to [facility] as requested and reinforced calling our ofc or on call provider if any new or worsening sx. Patient voices understanding/agrees with plan/no further questions.

## 2022-11-14 NOTE — TELEPHONE ENCOUNTER
Albuterol inhaler sent to pharmacy. Pt previously confirmed that [facility] was preferred pharmacy.     LMTCB

## 2022-11-14 NOTE — TELEPHONE ENCOUNTER
S/w Main Dumont. \"I feel like I have a cold\"  Symptoms began about 5 day ago  She has taken 2 covid tests since then--both negative    Dry cough  Rhinorrhea, nasal congestion   Low grade fever  Some chest tightness  Reports she is SOB at baseline--uses CPAP at night, no worsening SOB at this time  Denies any wheezing     Management: Tylenol   She uses Breo inhaler 1 puff daily   No albuterol--has an  inhaler     Main Dumont is wondering if  would like to see her in the office, or if he would like to send in an albuterol inhaler for her to use as needed? She does believe her symptoms may be starting to improve.

## 2022-11-14 NOTE — TELEPHONE ENCOUNTER
Pt would like to speak with nurse to determine if apt is needed at this time. Symptoms: dry cough, no phlegm, runny nose. Pt states for the past few days she has began developing small fever at or after 4 PM.     States temperature reads around 99.4-99.6 each evening, Saturday evening temperature was 100.1    Pt takes tylenol and fever reduces. Please contact pt to discuss symptoms and if apt is needed.

## 2022-11-17 NOTE — TELEPHONE ENCOUNTER
Py was diagnosed with lymphedema. She was told to wear compression stockings and she exercises 2 x a week. She is still having pain and is wondering if she can take tylenol or ibuprofen. She also has stopped taking xarelto. Please advise. Thank you. [phone] if can not reach her she is having phone issues you can call magdalena her spouse.

## 2022-11-17 NOTE — TELEPHONE ENCOUNTER
S/W pt and she C/O right calf pain x 4 days and now pain has intensified and more sharp. Also with swelling and warm to touch. Pt denies any CP or SOB. Also per below she is still having pain and is wondering if she can take tylenol or ibuprofen. She also has stopped taking xarelto.

## 2022-11-18 NOTE — DISCHARGE INSTRUCTIONS
Start the Xarelto today. Return to the emergency room if she have any chest pain, shortness of breath, increasing pain in your leg you can take Tylenol for pain.

## 2022-11-18 NOTE — TELEPHONE ENCOUNTER
As long as she is seeing Dr. Dylan Goldman I do not need to see her.   She will need a repeat venous Doppler in 3 months after which time I could see her

## 2022-11-21 NOTE — TELEPHONE ENCOUNTER
Noted- Dr. Ceci Wright updated. She can take Tylenol for pain. NO Ibuprofen, Motrin, Advil, Aleve, Aspirin. Can continue low impact/non-strenuous activities ex: walking. Advise elevating leg. If any new/worsening symptoms and/or chest pain/shortness of breath she should call office.

## 2022-11-21 NOTE — TELEPHONE ENCOUNTER
S/w Terrance Buchanan. Advised she can take Tylenol for pain. NO Ibuprofen, Motrin, Advil, Aleve, Aspirin. Can continue low impact/non-strenuous activities ex: walking. Advised to elevate leg. If any new/worsening symptoms she was instructed to call our office. With any  chest pain/shortness of breath, difficulty breathing, she was advised to proceed to nearest ER immediately. Pt verbalized understanding and is agreeable to plan. No further questions at this time.

## 2022-11-21 NOTE — TELEPHONE ENCOUNTER
Received call from Moira. She is very concerned about the DVT in right leg. She scheduled with Ina Barrientos for 12/7/22. This was first available in their office. (Per recent TE with 's office, it appears they trying to get patient a sooner appt)    She is asking what she may take for pain in leg. Suggested Tylenol. Instructed to avoid Ibuprofin/ ASA as this would increase risk of bleeding. Eliza Bedoya to continue taking Xarelto as prescribed. Moira is asking if  has any other recommendations for her regarding activity limitations. She is requesting we try calling 2x back to back if she does not answer the first time around.

## 2022-11-21 NOTE — TELEPHONE ENCOUNTER
Pt states she was seen in  on 11/17 for leg swelling in the right leg. Pt was diagnosed with a blood clot in her leg. Pt is unable to get into see Hematologist 's office until 12/7. Pt is very worried that the blood clot could travel before she is able to see them. Transferred live to triage due to the health issue.

## 2022-12-07 NOTE — PROGRESS NOTES
Education Record    Learner:  Patient    Disease / Timi Kinney leg DVT  Started on Xarelto starter pack    Barriers / Limitations:  None   Comments:    Method:  Discussion   Comments:    General Topics:  Plan of care reviewed   Comments:    Outcome:  Shows understanding   Comments:  Pt has been seen in the past for blood clots. Was off the xarelto for about 3 months. Leg is feeling better.    She has swelling to phil legs, right greater than left in the evening, says she diagnosed with lymphadema about 3-4 months ago

## 2023-02-02 NOTE — TELEPHONE ENCOUNTER
Misti/RN with Dr. Germán Ny office called asking to expedite patient appointment. Pt has elevated calcium and Dr. Sunni Floyd thinks it could be due to lymphoma. I advised RN that I would forward chart to you for review. Please advise.

## 2023-02-02 NOTE — TELEPHONE ENCOUNTER
I called and spoke to pt. I instructed her Dr. Royce Savage does not want her to take any more vitamin D due to her high calcium level. Pt is aware he would like her to see endocrinology Dr. Preston Muñoz for evaluation. I called and spoke to [facility] at Dr. Katharine Duverney office. Dr. Royce Savage would like pt seen soon for the elevated calcium level. He has done a work up on her with testing not being conclusive as to the origin of the hypercalcemia. [facility] is sending a message to Dr. Preston Muñoz and they will call the pt with an appt time. I called Francisca Ulloa and informed her they will be reaching out to her. I instructed pt if she does not hear back from their office by Monday 02/06/2023 by 1:00 pm to let me know and I will follow up.  Pt. Agreed to plan and verbalized understanding

## 2023-02-03 NOTE — TELEPHONE ENCOUNTER
Please read below note. \"Laura colbert she has an ENT that she has seen many times in the past, would like to continue with him.   Dr.Patrick Gee/ ENT at [facility] \"

## 2023-02-03 NOTE — TELEPHONE ENCOUNTER
Attempted contact Dr. Mak Ghosh office at [phone] to expedite soon appt for this pt but the office is closed.

## 2023-02-03 NOTE — TELEPHONE ENCOUNTER
Received call back from Moira  She scheduled an appt with Elayne Sterling for 2/28 at 11:40 AM   She is asking us to fax over all recent labs to Elayne Sterling  Fax: [phone]  E-fax sent. I called 's office to request expedited appt---[phone] opt 2  S/w Carie Mcneill  Discussed request for expedited appt for possibly parathyroidectomy. She will send a message to their clinical team requesting sooner appt. Turn around time for call back is 24-48 business hours, they will call Moira back directly.     [facility]

## 2023-02-03 NOTE — TELEPHONE ENCOUNTER
S/w Katie Fu. Informed her that our office had reached out to endo. Endo suggested consult with ENT for possible parathyroidectomy. Kvng Torre she has an ENT that she has seen many times in the past, would like to continue with him. Dr.Patrick Gee/ ENT at [facility] requested I cancel appt with . Katie Fu will call 's office to request expedited appt. She will call us back with appt info. Call to DULY.   S/w Michele Salvador   She cancels the appt with  on 2/9/23

## 2023-02-03 NOTE — TELEPHONE ENCOUNTER
Called Dr. Patterson Dears office requesting expedited appt and spoke with [facility] on the nurse line. She scheduled a cancellation appt for 2/9/23 at 7:40 AM.       LMTCB to confirm pt can go to scheduled appt wt Dr. Ramos Da Silva on 2/9/23 at 7:40 AM and advise this is an expedited appt.

## 2023-03-03 NOTE — TELEPHONE ENCOUNTER
Patient calling to update  with surgery date:    Parathyroidectomy on 3/24/23 with Dr.Seth Kathleen Balderas sts they will fax us pre-op paperwork     Katie Salguero is asking when can you see her for pre-op

## 2023-03-03 NOTE — TELEPHONE ENCOUNTER
Appt scheduled for 3/9/23    Ivone Arroyo she just got off the phone with [facility].  They told her they will be faxing us the pre-op paperwork today

## 2023-03-13 NOTE — TELEPHONE ENCOUNTER
Per Dr Angela Castillo, he would like patients cardiologist give a note of clearance for pt's upcoming surgery 3.24  Pt is on metoprolol for palpitations under cardio care--Dr Heladio Santamaria with [facility]. Dr Fernando Michelle asks to confirm if pt is on xarelto? When we saw her last week--she did not mention she was. S/w pt on this. Confirms she is on xarelto 20mg as of Dec. It was originally dc'd in [address] but reports she had a clot in leg in dec and cardio placed her back on it. She reports surgeon ofc told her to hold xarelto x 5 days prior to surgery, then resume 5 days after. I have updated her med list.    Tc to Dr Heladio Santamaria [facility]. [phone] S/w his nurse Roseline. Asked her to please have Dr Heladio Santamaria send us a note advising if she is clear for surgery and if he agrees with instructions for xarelto. She does not think she will need a separate visit for this and that he should be able to write a note. She has sent a msg to him with this request. I advised her to please fax to Dr Fernando Michelle and we can send with his note of clearance. She voiced understanding. Pt updated on above.

## 2023-04-26 NOTE — PROGRESS NOTES
Education Record    Learner:  Patient    Disease / Diagnosis: PE, right DVT      Barriers / Limitations:  None   Comments:    Method:  Discussion   Comments:    General Topics:  Plan of care reviewed   Comments:    Outcome:  Shows understanding   Comments:  Taking Xarelto. Recent parathyroidectomy. Still adjusting calcium supplements   Held xarelto 10 days total for surgery. Chronic lymphadema right more than left. Having cramping, feet and legs right leg worse than left. Seeing MD this afternoon. Unsure if sx of DVT, with lymphadema.    No abnormal bleeding with xarelto

## 2023-05-16 NOTE — TELEPHONE ENCOUNTER
Pt called and stated she pulled a stomach muscle 5 days ago. She don't recall how she did it. All of the activities that she did that day was exercise. She is current wearing a brace around her stomach and back to keep her stomach in place, certain movements without brace causes sharp pain. Pt will like to see doctor right away. Requesting for someone to call her tomorrow, will not be home today.

## 2023-05-17 NOTE — TELEPHONE ENCOUNTER
S/w pt. Reports past 5-6 days having intermittent sharp pains starting at belly button that goes straight up 4 inches. Denies injury but reports it feels like a strain. No cp. Feels bloated. Said belly looks bloated. Wearing an elastic band brace to avoid straining as pain occurs with certain movements. Helps a little. I offered tomorrow with Dr Roverto Flores, she declines, only wants Dr Shine Vargas. I booked her Monday as this is his first available. Advised if sx's worsen prior urgent care. She voiced understanding.

## 2023-05-24 NOTE — TELEPHONE ENCOUNTER
Pt received call from Dr Cecy Franks last PM (see CT result notes) regarding MRI order.     Pt calling to updated Dr Cecy Franks that MRI has been scheduled for 6/6 at 5:30 pm.    Sina Vásquez

## 2023-06-05 NOTE — TELEPHONE ENCOUNTER
Patient would like to speak to Dr Umu Garcia. She does not know when she should schedule a follow up appointment. Please advise patieny. Thank you,.

## 2023-06-07 NOTE — TELEPHONE ENCOUNTER
Patient requesting call back to discuss MRI results done yesterday 6/7/2023. Please advise. Thank you.         MRI MRCP W/3D ONLY (CPT=76376/05341)

## 2023-06-07 NOTE — TELEPHONE ENCOUNTER
S/W pt and informed her below. Pt wants to know if she can talk to about all the results. States she has some questions and would like to discuss them with you only. Per pt, she doesn't doing more tests, but would like to discuss what she already has done prior to doing more. Please advise of appt availability. Pt aware Dr. Alex Bernard is no longer in the office today.

## 2023-06-07 NOTE — TELEPHONE ENCOUNTER
The MRCP shows what appears to be gallstones in the common bile duct. Please refer her back to her gastroenterologist, Dr. Claudette Polk. Have her obtain a CBC, CMP. It also shows evidence of significant cirrhosis. Have her obtain an AFP also.

## 2023-06-09 PROBLEM — I27.20 PULMONARY HYPERTENSION (HCC): Status: ACTIVE | Noted: 2023-06-09

## 2023-06-09 NOTE — TELEPHONE ENCOUNTER
Dr Yenny Najera called regarding patients MRI results. He s/w Dr Analia Johnson re:    Cholecystectomy with findings of extensive choledocholithiasis with multiple calculi identified within the common hepatic and common bile ducts    *He had originally recommended pt see Dr Analia Johnson for her sx's (ov here yesterday). Dr Lu Graft s/w Dr Analia Johnson directly and Dr Analia Johnson suggested pt see Maricruz Agarwal M.D.instead of Dr Analia Johnson for this. Dr Yenny Najera asks me to relay to pt. I called pt. Advised of MRI results with recommendations. Info provided for Dr Cosmo Marrufo. She will cb if they can't see her sooner than later. She voiced understanding.

## 2023-06-12 NOTE — TELEPHONE ENCOUNTER
Orquidea Tubbs called to let Dr Neptali Sy know she has to have an ERCP this Friday. She was told to let Dr Neptali Sy know. She needs directions regarding holding her xarelto. Orquidea Tubbs is requesting a call back this afternoon at [phone].

## 2023-06-12 NOTE — TELEPHONE ENCOUNTER
I called Tim Lentz and left a detailed voice mail message that she should hold her xarelto 48 hours before her ERCP. She should resume it the day after her procedure or when cleared by GI. I left the office phone number if she has any questions or concerns.

## 2023-06-12 NOTE — TELEPHONE ENCOUNTER
Citlalli from [facility] office needs blood thinner silviano for patient who is having procedure this Friday.       Office# [phone]  Fax#[phone]

## 2023-06-14 NOTE — PAT NURSING NOTE
In response to the allergic reaction/contraindication to Levaquin, we received an order for Unasyn. This medication is also contraindicated due to patients allergy to Cefuroxime. Faxed this information to Dr. Romy Musa, along with all the antibiotics that the patient is allergic to. Telephoned Dr. Rebecca Birmingham office and left a voice message for Citlalli regarding the above.

## 2023-06-16 PROBLEM — R10.9 ABDOMINAL PAIN: Status: ACTIVE | Noted: 2023-06-16

## 2023-06-16 PROBLEM — K85.90 POST-ERCP ACUTE PANCREATITIS (HCC): Status: ACTIVE | Noted: 2023-06-16

## 2023-06-16 PROBLEM — K91.89 POST-ERCP ACUTE PANCREATITIS (HCC): Status: ACTIVE | Noted: 2023-06-16

## 2023-06-16 NOTE — ANESTHESIA POSTPROCEDURE EVALUATION
[address] Patient Status:  Hospital Outpatient Surgery   Age/Gender 68year old female MRN [id number]   Location [address] [address] Attending Francoise Hinojosa MD   Hosp Day # 0 PCP Basilio Fowler MD       Anesthesia Post-op Note    ENDOSCOPIC RETROGRADE CHOLANGIOPANCREATOGRAPHY (ERCP) WITH SPHINCTEROTOMY, BALLOON DILATION, BALLOON SWEEP , STONE BASKET EXTRACTION, SPYGLASS, AND PANCREATIC AND BILE DUCT STENTS PLACEMENT    Procedure Summary     Date: 06/16/23 Room / Location: [address] [place] [address] ENDOSCOPY 01 / 1404 [facility] ENDOSCOPY    Anesthesia Start: 5692 Anesthesia Stop: 7724    Procedure: ENDOSCOPIC RETROGRADE CHOLANGIOPANCREATOGRAPHY (ERCP) WITH SPHINCTEROTOMY, BALLOON DILATION, BALLOON SWEEP , STONE BASKET EXTRACTION, SPYGLASS, AND PANCREATIC AND BILE DUCT STENTS PLACEMENT Diagnosis: (CHOLEDOCHOLITHIASIS)    Surgeons: Francoise Hinojosa MD Anesthesiologist: Bruna Rivera MD    Anesthesia Type: MAC ASA Status: 2          Anesthesia Type: MAC    Vitals Value Taken Time   /71 06/16/23 1417   Temp 98 06/16/23 1417   Pulse 45 06/16/23 1417   Resp 18 06/16/23 1417   SpO2 95 % 06/16/23 1417   Vitals shown include unvalidated device data. Patient Location: Endoscopy    Anesthesia Type: MAC    Airway Patency: patent and extubated    Postop Pain Control: adequate    Mental Status: mildly sedated but able to meaningfully participate in the post-anesthesia evaluation    Nausea/Vomiting: none    Cardiopulmonary/Hydration status: stable euvolemic    Complications: no apparent anesthesia related complications    Postop vital signs: stable    Dental Exam: Unchanged from Preop    Patient to be discharged from PACU when criteria met.

## 2023-06-16 NOTE — PLAN OF CARE
Problem: Patient/Family Goals  Goal: Patient/Family Long Term Goal  Description: Patient's Long Term Goal: Discharge home    Interventions:  - IVF  -Diet as tolerated  - See additional Care Plan goals for specific interventions  Outcome: Progressing  Goal: Patient/Family Short Term Goal  Description: Patient's Short Term Goal: Comfort    Interventions:   - Pain meds as needed  - See additional Care Plan goals for specific interventions  Outcome: Progressing     Problem: PAIN - ADULT  Goal: Verbalizes/displays adequate comfort level or patient's stated pain goal  Description: INTERVENTIONS:  - Encourage pt to monitor pain and request assistance  - Assess pain using appropriate pain scale  - Administer analgesics based on type and severity of pain and evaluate response  - Implement non-pharmacological measures as appropriate and evaluate response  - Consider cultural and social influences on pain and pain management  - Manage/alleviate anxiety  - Utilize distraction and/or relaxation techniques  - Monitor for opioid side effects  - Notify MD/LIP if interventions unsuccessful or patient reports new pain  - Anticipate increased pain with activity and pre-medicate as appropriate  Outcome: Progressing

## 2023-06-16 NOTE — OPERATIVE REPORT
[facility] OPERATIVE REPORT   PATIENT NAME: Tate Pearl  MRN: [id number]  DATE OF OPERATION: 6/16/2023  PREOPERATIVE DIAGNOSIS: choledocholithiasis with abdominal pain  POSTOPERATIVE DIAGNOSIS:    1.  Large 12mm stones x 3 in a dilated 15mm CBD   2.  multiple smaller 1cm stones all removed with sphincterotomy, papillotomy and balloon   3. Hemobilia after stone extraction identified by cholangioscopy s/p biliary stent placement   PROCEDURE PERFORMED: Endoscopic Retrograde Cholangiopancreatoscopy   SEDATION MEDICATIONS: MAC  PREOPERATIVE MEDS: unasyn 1.5gm IVPB; 500cc lactated Ringer's solution IV  INTRAPROCEDURE MEDS:  indomethacin 100 mg Per rectum  PREPROCEDURE ASSESSMENT: The indication for this procedure is to assess for CBD stones. The patient was identified by myself and nursing staff in the exam room. Informed consent was obtained. The patient was seen in clinic and a full H&P was obtained. On brief physical examination, airway is patent. Chest is clear. Heart has regular rate and rhythm. Abdomen is soft, nontender with good bowel sounds. A medication list was taken by nursing today and reviewed by myself. The patient is an ASA grade 2. PROCEDURE NOTE: The procedure was completed without difficulty. The patient tolerated the procedure well. The side viewing duodenoscope was inserted through the mouth and advanced to the level of the duodenum, 3rd portion. Visualized portion of the esophagus, stomach including antrum, body, fundus and cardiac, and duodenum were normal.  Ampulla was small. Initial cannulation with fusion omnitome 035\" showed wire going into the dorsal pancreatic duct. Wire was placed into the dorsal duct. A 2nd wire was advanced into the CBD. Cannula was advanced over the wire. Contrast injection showed a 15mm CBD with multiple filling defects throughout the CBD. A sphincterotomy was performed carefully.   Due to small papilla, papillary balloon dilation was performed with 4mm, then 6mm and then 8mm serially. Papillary dilation was successful. Excellent drainage was noted and multiple small stones came out spontaneously. Balloon sweep with 8-15mm was used to remove multiple smaller 8-10mm stones from the distal CBD. 2 large stones were attempted to be removed with over-tie-wire basket but the stones could not be trapped. Therefore the stones were carefully removed with balloon sweep. All stones were removed. Occlusion cholangiogram showed no further filling defects. No stones were in the intrahepatic ducts. Cholangioscopy showed no stones in the CBD but there were multiple long blood clots in the CBD. No active bleeding was seen. A 10fr x 9cm biliary stent was placed successfully. Excellent biliary drainage. Scope was withdrawn from the patient and patient tolerated the procedure well. FINDINGS   1. CBD stones were all removed. 2. Post-stone extraction hemobilia- stent was placed to avoid recurrent biliary obstruction  RECOMMENDATIONS: repeat ERCP in 4-6 weeks to remove the stent  DISCHARGE:  On discharge, the patient was given an after-visit summary detailing the procedure, findings, followup plans, and an updated medication list.     Thank you very much for the consultation. I really appreciate it.     Joseline Still MD

## 2023-06-17 NOTE — PLAN OF CARE
Received patient drowsy with spouse at bedside. On room air, breath sounds clear. On tele, SB. NPO status maintained per Dr Sole Kumar. Up to the bathroom with standby assist per request. Patient much more awake and oriented later in shift. No request for pain medication so far.

## 2023-06-17 NOTE — PLAN OF CARE
Patient A&Ox4, RA, up ad radha after set up. IVF infusing per mar. Tolerating clear liquid diet. Denies nausea at this time. Reports mild, tolerable abdominal cramping pain. Voiding. Passing gas. Plan of care discussed w/ patient and family at bedside.     Problem: Patient/Family Goals  Goal: Patient/Family Long Term Goal  Description: Patient's Long Term Goal: Discharge home    Interventions:  - IVF  -Diet as tolerated  - See additional Care Plan goals for specific interventions  Outcome: Progressing  Goal: Patient/Family Short Term Goal  Description: Patient's Short Term Goal: Comfort    Interventions:   - Pain meds as needed  - See additional Care Plan goals for specific interventions  Outcome: Progressing

## 2023-06-18 NOTE — PROGRESS NOTES
NURSING DISCHARGE NOTE    Discharged Home via Wheelchair. Accompanied by Support staff  Belongings Taken by patient/family. VSS, tolerating diet, voiding adequately, pain controlled, tolerating ambulation. Discharge education provided. Reviewed medications and follow up appts. All questions answered and concerns addressed, pt, , and son verbalized understanding. IV removed. Pt dc in stable condition.  Patient left unit via wheelchair at [phone]

## 2023-06-18 NOTE — PLAN OF CARE
Alert, orient. Room air. Denies nausea, tolerating diet. SBA assist to bathroom. Cont, voiding freely. Reports passing gas. Tele:NSR.  Calling for assist.

## 2023-06-18 NOTE — PROGRESS NOTES
Alert & oriented x4. VSS on room air. Voids. Tolerating low fiber diet. Minimal nausea. Dry mouth - encouraged fluids. Ambulates independently. Denies chest pain/SOB. Pain controlled. Patient updated on plan of care. Questions and concerns addressed.

## 2023-06-21 NOTE — ED INITIAL ASSESSMENT (HPI)
Patient had ERCP procedure on Friday. Concerned for infection due to fevers at home, fatigue and weakness.

## 2023-06-21 NOTE — ED QUICK NOTES
Orders for admission, patient is aware of plan and ready to go upstairs.  Any questions, please call ED MALCOLM Hernandez at extension [phone]     Patient Covid vaccination status: Fully vaccinated     COVID Test Ordered in ED: None    COVID Suspicion at Admission: N/A    Running Infusions:      Lactated Ringers 1,000 mL at a rate of 200 mL/hr      Mental Status/LOC at time of transport:  Alert    Other pertinent information:   CIWA score: N/A   NIH score:  N/A

## 2023-06-21 NOTE — PROGRESS NOTES
06/21/23 1716   BiPAP   $ RT Standby Charge (per 15 min) 1   BiPAP/CPAP Monitored Parameters   BERNIE Protocol Yes   Toleration Refused

## 2023-06-21 NOTE — PLAN OF CARE
Alert and oriented x 4. Vitals stable on room air. BERNIE/ CPAP refused. Voiding without difficulty. Abdomen distended, no guarding. Last BM 06/19. NPO with ice chips, sips with meds. IV fuids administered per [facility] SCD's on bilaterally. Safety precautions in place. Plan of care discussed with patient.

## 2023-06-21 NOTE — PROGRESS NOTES
Patient admitted via stretcher from ED. Oriented to room. Safety precautions initiated. Call light in reach.

## 2023-06-22 NOTE — PLAN OF CARE
A&Ox 4. VSS. On room air. . IS encouraged. Telemetry monitoring. Refusing SCD's. Ankle pumps encouraged. Tolerating diet. Bowel sounds present. Last BM 6/22, liquidy. ISO for rule out Cdiff. Voiding. C/o lower back pain and abd pain. Pain controlled with IV meds. Ambulating adlib. Safety precautions maintained. Call light within reach.

## 2023-06-22 NOTE — PLAN OF CARE
Alert and oriented x 4. Vitals stable on room air. BERNIE/ CPAP refused. Voiding without difficulty. Abdomen distended, no guarding. Last BM 06/22. NPO with ice chips, sips with meds. IV fuids administered per MAR. SCD's on bilaterally. Safety precautions in place.   Plan of care discussed with patient

## 2023-06-24 NOTE — PLAN OF CARE
Pt remains AxO4. On RA. Elevated temp today tylenol given, controlled w/ PRN tylenol. Refused SCD, on xarelto. 0.9 @ 150. BM loose-watery, Pain control with PRN medications as ordered. Plan: On IV zosyn, repeat blood cultures pending, pain control, repeat CT-resulted team aware. Up w/ standby assist. Updated on POC. Safety measures placed.

## 2023-06-24 NOTE — PLAN OF CARE
Pt alert and oriented x4. Anxious at times, therapeutic communication PRN. VS stable on room air. TMAX this shift, 101.2. GI MD made aware, blood cx x2 done. IV abx infusing per order. Tolerating current diet. Pt c/o nausea this evening, PRN antiemetic given. IVF infusing per order. K replaced per order. Loose BM x3 this shift, GI stool panel results in Epic. Pt c/o mild, dull lower back pain, declines pain medication. Up standby assist. Plan to continue IV abx for now, blood cxs pending. Pt and spouse updated to plan of care, all questions answered. Instructed to call for assistance, call light within reach.

## 2023-06-25 NOTE — PLAN OF CARE
Pt A&Ox4. Febrile this AM, PO tylenol given. Telemetry monitoring - NSR. Able to eat jello this AM. Voiding without difficulty. Pt having loose stools. Repeat blood cx pending, IV zosyn Q8. Pain controlled with PO and IV medications. IVF infusing per orders. Fall precautions in place and pt reminded to \"call; don't fall. \" Pt's spouse at bedside. Updated with POC.

## 2023-06-25 NOTE — PROGRESS NOTES
[facility] Pharmacy Note:  Age Based Dose Adjustment    Tate Pearl has been prescribed ketorolac (TORADOL) 30 mg IV every 6 hours as needed. Patient is >71 years old therefore the dose has been adjusted to 15 mg IV every 6 hours as needed.       Thank you,  Davion Jackson, PharmD  6/25/2023 10:07 AM

## 2023-06-25 NOTE — PROGRESS NOTES
Dr Fredy Germain form ID was here. Her note states: \"Recommend IR consult for possible aspiration vs drain placement, please send fluid for aerobic, anaerobic and fungal cultures \". Message sent to Dr Janie Reinoso to update and request him to place orders for IR consult. Awaiting response. 1910: Per Dr King Ibrahim, GI: \" I spoke with IR and reviewed Ms Oscar Willingham' CT with Dr Drake Reese. Both of us feel we should hold off aspiration of the fluid for the moment. The fluid collection is not particularly organized and is unlikely to yield much fluid given the particulate present. We stand a better chance if we try to give this further time to mature\"    1915: Spoke to Dr King Ibrahim. He spoke to ID MD and all are in agreement on holding off on aspiration at this time. Will continue IV Zosyn as ordered. MDs to re-assess tomorrow. If pt's temps spike again, pt will need repeat cultures drawn.

## 2023-06-26 NOTE — PLAN OF CARE
Patient A&O X4 on RA- hx BERNIE no CPAP. VSS, /IS/telemetry- NSR. SCDs, Xarelto. Voiding freely, LBM 6/25. IVF infusing per orders. On IV Zosyn Q8h. Tolerating diet- denies nausea. Pain controlled with PO/IV medication. Up standby assist w/ walker. Reminded to use call light. Plan to monitor temps and pain control. 0030: Patients temp 103. Per nursing communication, repeat blood cultures ordered. Tylenol and ice packs given.

## 2023-06-26 NOTE — PROGRESS NOTES
Gi addendum    Long d/w  about pt clinical status     had pancreatic surgery w/ Dr Colin Jennings, he is asking for c/s w/ Dr Brandon Loo team as well           demonstrated understanding of risks of morbidity/mortality if diagnoses and/or treatments were delayed balanced against risks of further investigation and/or treatment. --the  demonstrated understanding of the results and recommendations and options and the risk/benefit/limitations and alternatives to the plan. All of their questions and concerns were addressed and were agreeable to the plan as listed        Addendum 4 pm    Met w/ pt, spouse, son. Pt states pain improved. Abd exam unchnaged. Ilia diet.  Reviewed issues w/ infection, procedural intervention etc  --advised low fat diet as ilia Oh MD  [address] [place] [address] Gastroenterology

## 2023-06-26 NOTE — PLAN OF CARE
Patient on iv abx for pancreatitis. Patient febrile at 102. 7. tylenol given. Will monitor. Pain controlled.

## 2023-06-27 NOTE — PLAN OF CARE
Patient alert and oriented x4. VSS on RA. Afebrile. Tele in NSR Pain controlled with PRN PO pain meds. SCDs in place. IS encouraged. Pt up with standby. Tolerating diet, no complaints of n/v. Voiding freely in bathroom. Pt declining gait belt during ambulation, educated on importance and safety. Plan: IV fluids and IV antibiotics as ordered.  Blood cultures pending

## 2023-06-27 NOTE — PLAN OF CARE
Alert and oriented x4. VSS. On RA. . Temp elevated, PRN tylenol given. IS encouraged. Telemetry monitoring. Tolerating diet. Pain controlled with PRN medication. Pt reporting nausea, antiemetic given. Ambulating with min assist. Voiding freely. IV abx and IVF as ordered. Blood cultures pending. Surgical oncology consult. Call light within reach.

## 2023-06-28 NOTE — PLAN OF CARE
Alert and oriented x4. VSS. On RA. . IS encouraged. Telemetry monitoring-NSR. Tolerating diet. Pain controlled with PRN medication. IVF and IV abx as ordered. Blood cultures pending. Ambulating with standby assist. Pt declining gait belt during ambulation, educated on importance and safety. Call light within reach.

## 2023-06-28 NOTE — PROGRESS NOTES
Met w/  again today    Reviewed pt's care and risk of recurrence/worsening    He had several questions about low fat diet  --plan monitoring tonight and home possibly tomorrow if does well  --will ask for nutrition c/s regardng low fat diet  --reminded him to f/u w/ dr Blackwell Schooling as outpt        All questions answered    Guy Lombard, MD  [address] [place] [address] Gastroenterology

## 2023-06-28 NOTE — PLAN OF CARE
Patient alert and oriented x4. VSS on RA. Tele in NSR. Pain controlled with PRN PO pain meds. Refusing SCDs, IS encouraged. Pt up with standby assist. Tolerating diet, no c/o n/v. Voiding freely in urinal. IV fluids and IV antibiotics as ordered.  Slight swelling in right arm, US doppler ordered by hospitalist.     Plan: Discharge planning once cleared by all services

## 2023-06-28 NOTE — DISCHARGE SUMMARY
[facility] HOSPITALIST  DISCHARGE SUMMARY     Phill Avila Patient Status:  Inpatient    1946 MRN [id number]   [facility] 3SW-A Attending Oziel Sanford MD   [address] PCP Cherie Bullard MD     Date of Admission: 2023  Date of Discharge:   2023      Discharge Disposition: Home or Self Care    Discharge Diagnosis:  Abd Pain  Acute Pancreatitis  Pseudocyst  Hx of DVT    History of Present Illness: Phill Avila is a 68year old female with abdominal pain and distension, back pain and nausea for 5 days. Patient had  ERCP performed on Friday. She states she has continued to have pain from her epigastric region down to her umbilicus and back pain, intermittent dull 5/10. Herbert Chandra She also notes she has had nausea and poor appetite. She states she has had fevers. She called her GI physician who told her to come to the ER for further evaluation. Brief Synopsis: Pt was admitted and given pain control and iVF. She was seen  by GI. She had fevers during stay and treated with IV abx and evaluated by ID. The fevers were likely due to inflamm response. She was afebrile and tolerating diet prior to DC. Lace+ Score: 69  59-90 High Risk  29-58 Medium Risk  0-28   Low Risk       TCM Follow-Up Recommendation:  LACE > 58:  High Risk of readmission after discharge from the hospital.      Procedures during hospitalization:   none    Incidental or significant findings and recommendations (brief descriptions):  none    Lab/Test results pending at Discharge:   none    Consultants:  GI, ID    Discharge Medication List:     Discharge Medications        CHANGE how you take these medications        Instructions Prescription details   Breo Ellipta 100-25 MCG/ACT Aepb  Generic drug: fluticasone furoate-vilanterol  What changed: how to take this      TAKE 1 PUFF BY MOUTH EVERY DAY   Quantity: 3 each  Refills: 0            CONTINUE taking these medications        Instructions Prescription details albuterol 108 (90 Base) MCG/ACT Aers  Commonly known as: Ventolin HFA      INHALE 2 PUFFS INTO THE LUNGS EVERY 4 TO 6 HOURS AS NEEDED FOR WHEEZING   Quantity: 6.7 each  Refills: 0     donepezil 5 MG Tabs  Commonly known as: Aricept      Take 1 tablet (5 mg total) by mouth nightly. Refills: 0     fenofibrate 145 MG Tabs  Commonly known as: Tricor      TAKE 1 TABLET BY MOUTH EVERY DAY   Quantity: 90 tablet  Refills: 1     fluticasone propionate 50 MCG/ACT Susp  Commonly known as: Flonase      2 sprays by Each Nare route daily. Quantity: 3 Bottle  Refills: 3     loperamide 2 MG Caps  Commonly known as: Imodium      Take 1 capsule (2 mg total) by mouth as needed for Diarrhea. Refills: 0     metroNIDAZOLE 0.75 % Lotn      APPLY SMALL AMOUNT TO FULL FACE ONCE DAILY AFTER CLEANSING   Refills: 0     Move Free Joint Health Advance Tabs      Take by mouth. Refills: 0     pantoprazole 40 MG Tbec  Commonly known as: Protonix      TAKE 1 TABLET (40 MG TOTAL) BY MOUTH 2 (TWO) TIMES DAILY BEFORE MEALS. Quantity: 180 tablet  Refills: 1     Toprol XL 50 MG Tb24  Generic drug: metoprolol succinate ER      Take 1 tablet (50 mg total) by mouth nightly. Refills: 0     VITAMIN B-3 OR      Take by mouth. Refills: 0     VITAMIN D (CHOLECALCIFEROL) OR      Take by mouth daily. Refills: 0     Xarelto 20 MG Tabs  Generic drug: rivaroxaban      Take 1 tablet (20 mg total) by mouth daily with food.    Quantity: 90 tablet  Refills: 3              [place]    Follow-up appointment:   Raymundo Michael MD  [facility]  [phone]    Follow up in 1 week(s)      Pepe Parada MD  [address] [place] [address] [phone]    Schedule an appointment as soon as possible for a visit in 1 week(s)      Jennie Rosario MD  [address] [place] [address]  [phone]    Follow up  As needed    Appointments for Next 30 Days 7/5/2023 - 8/4/2023        Date Arrival Time Visit Type Length Department Provider     2023  1:30 PM  [facility] 30 min [address], Jaspreet Cabrera MD    Patient Instructions:                           Vital signs:         -----------------------------------------------------------------------------------------------  PATIENT DISCHARGE INSTRUCTIONS: See electronic chart    Trevon Gurrola MD    Total time spent on discharge plannin minutes     The [facility] makes medical notes like these available to patients in the interest of transparency. Please be advised this is a medical document. Medical documents are intended to carry relevant information, facts as evident, and the clinical opinion of the practitioner. The medical note is intended as peer to peer communication and may appear blunt or direct. It is written in medical language and may contain abbreviations or verbiage that are unfamiliar.

## 2023-06-28 NOTE — PROGRESS NOTES
Per GI, Dr. Zhanna Martin, he wants to keep patient another night to monitor fevers. Per GI, no discharge today.

## 2023-06-29 NOTE — PROGRESS NOTES
06/28/23 0911   Clinical Encounter Type   Visited With Patient and family together   Routine Visit Introduction   Continue Visiting No   Referral From Other (Comment)   Referral To    Taxonomy   Intended Effects Build relationship of care and support   Methods Offer spiritual/Baptism support   Interventions Explain  role   Trigger for Consult   Trigger for Spiritual Care Consult No     The  responded to the spiritual consult list for patient care.  explained the reason for the call and services provided by the spiritual care department. No services needed at this time. Spiritual Care support can be requested via an Epic consult.       [address]

## 2023-06-29 NOTE — PLAN OF CARE
Pt is Aox4, VSS on RA, denies pain at rest, denies nausea this morning, no tenderness to abdomen, bowel sounds present. Patient stating she is still having diarrhea, imodium given this AM, labs look stable, belongings and call light within reach. Plan for discharge home today.

## 2023-06-29 NOTE — PLAN OF CARE
Alert and oriented x4. VSS. On RA. . IS encouraged. Telemetry monitoring-NSR. Tolerating diet. Voiding freely. Denies pain at this time. IVF and IV abx as ordered. Blood cultures pending. Ambulating with standby assist. Call light within reach.

## 2023-08-20 NOTE — TELEPHONE ENCOUNTER
Pt asked when is the best time should she and her , Wilmer Staff (also a pt of Dr. Camille Stout) have their flu vaccination? ETOH abuse

## 2023-08-21 NOTE — PAT NURSING NOTE
[facility] office again  and spoke to Heather Plush regarding response to antibiotic contraindication alert sent on 8/18/2023 in response to patient allergy to ordered premedication,He stated he sees my earlier message  and he will reach out to  [facility] and fax response

## 2023-08-22 NOTE — OPERATIVE REPORT
[facility] OPERATIVE REPORT   PATIENT NAME: Alex Urena  MRN: [id number]  DATE OF OPERATION: 8/22/2023  PREOPERATIVE DIAGNOSIS: prior history of choledocholithiasis s/p ERCP, stone extraction, and stent placement due to post sphincterotomy bleeding; severe post ERCP pancreatitis that healed with time, low fat diet; here for stent removal and evaluation of more CBD stones  POSTOPERATIVE DIAGNOSIS:    1. Dilated 15mm CBD without few soft stones removed with balloon sweep   2. Previously placed biliary stent was removed   3. Contrast flushed from CBD with water   PROCEDURE PERFORMED: Endoscopic Retrograde Cholangiopancreatoscopy with stone extraction with balloon sweep  SEDATION MEDICATIONS: MAC  PREOPERATIVE MEDS: clindamycin 600 mg IVPB;  500cc lactated Ringer's solution IV  INTRAPROCEDURE MEDS:  indomethacin 100 mg Per rectum  PREPROCEDURE ASSESSMENT: The indication for this procedure is to assess for CBD stones. The patient was identified by myself and nursing staff in the exam room. Informed consent was obtained. The patient was seen in clinic and a full H&P was obtained. On brief physical examination, airway is patent. Chest is clear. Heart has regular rate and rhythm. Abdomen is soft, nontender with good bowel sounds. A medication list was taken by nursing today and reviewed by myself. The patient is an ASA grade 2. PROCEDURE NOTE: The procedure was completed without difficulty. The patient tolerated the procedure well. The side viewing duodenoscope was inserted through the mouth and advanced to the level of the duodenum, 3rd portion. Visualized portion of the esophagus, stomach including antrum, body, fundus and cardiac, and duodenum were normal.  Previously placed biliary stent was noted to be ball-valving in and out of the papilla. The stent was removed with snare through the scope. Biliary cannulation was easily achieved with 9-12mm extraction balloon with a guide wire.   There was an ulcerated area seen at site of prior sphincterotomy likely from post sphincterotomy bleeding. Occlusion cholangiogram showed a very dilated CBD of 15mm without obvious filling defects. With balloon hyperinflated sweep that was done very carefully to avoid repeat bleeding, several soft stones were removed from the CBD. Further occlusion cholangiogram did not yield any stone debris. Contrast was slow to drain from the CBD and therefore water was used to irrigate the CBD through the balloon cathter and there was brisk flow of water through the papilla indicating great sphincterotomy opening and brisk contrast drainage was noted from the CBD. PD was not cannulated. Scope was withdrawn from the patient and patient tolerated the procedure well. FINDINGS   Previous stent was removed  Residual stone debris was cleared with balloon sweep  RECOMMENDATIONS: no follow up with me needed  DISCHARGE:  On discharge, the patient was given an after-visit summary detailing the procedure, findings, followup plans, and an updated medication list.     Thank you very much for the consultation. I really appreciate it.     Mallory Cadet MD

## 2023-09-15 PROBLEM — N18.30 CKD (CHRONIC KIDNEY DISEASE) STAGE 3, GFR 30-59 ML/MIN (HCC): Chronic | Status: ACTIVE | Noted: 2023-09-15

## 2023-10-09 NOTE — TELEPHONE ENCOUNTER
Pt states she was at the [facility] recently for uit and was given antibiotics but it is still ongoing. Pt would like to see Dr. Vignesh Franks as soon as possible and does not want to see anyone else.  Pt also would like to talk to nurse

## 2023-10-09 NOTE — TELEPHONE ENCOUNTER
Please ask patient regarding her symptoms. Unfortunately I do not have an openings tomorrow. I would advise patient to go to urgent care she needs to have urine cultures done. She had a history of  resistant bacteria from the urine. Other options to see Tiny Tinsleyst tomorrow in the office.   Thank you

## 2023-10-10 NOTE — PATIENT INSTRUCTIONS
Take the antibiotic with food. Take a probiotic while you are on the antibiotic, such as Align (this is a capsule that is available over-the-counter), or organic yogurt  Drink plenty of fluids and electrolytes. Call to schedule appointment with urology-- or one of his partners. Please call our office with any reactions you may experience on this antibiotic. May try [facility] Cohosh for post menopausal symptoms. I would not recommend evening primrose oil as this interacts with your Xarelto.

## 2023-10-17 NOTE — TELEPHONE ENCOUNTER
Spoke with patient regarding recent test results. Patient is asking if Dr. Mercedes Cortes would recommend the RSV vaccine for her.

## 2023-10-17 NOTE — TELEPHONE ENCOUNTER
Consulted with . Per CDC guidelines patient is a candidate for vaccine. Adults 60 years and older may receive a single dose of the RSV vaccine. Per CDC, the RSV vaccine can be given at the same time as other vaccines but please be aware that there could be an increased incidence of possible side effects from combination of vaccines. The COVID vaccine can be given to anyone 72 or older if it has been 4 or more months since their last vaccine. There are regularly published rates of infection. If the infection rates are rising, it would be worth getting the vaccine sooner rather than later. An annual COVID vaccine update is now recommended every fall. We recommend continuing vaccination, especially to those at risk of hospitalization (those with chronic conditions, immunosuppression, or age 72 and older).    It is OK to separate the RSV, Flu and COVID if desired, but they are safe to be given together and it is up to the patient to determine if they want to separate the vaccines

## 2023-10-17 NOTE — TELEPHONE ENCOUNTER
Called and informed patient of below. Pt voiced understanding and she has no further questions at this time.

## 2023-10-23 NOTE — PROGRESS NOTES
[facility], [address], [address] [facility]    Urology Consult Note    History of Present Illness:   Patient is a 68year old female with hx of pulmonary HTN, HL, COPD, post ERCP pancreatitis, BERNIE, hyperparathyroidism, breast CA, pelvic organ prolapse s/p SCPXY, TVT 2013; Dr. Brenda Nunes, who presents today for consultation from Dr. Renard Sandy office for recurrent UTI. Reports ~ 2-3 UTI episodes per year. UTI symptoms include: dysuria, urinary frequency, urgency  No gross hematuria    Hx of pyelonephritis: none  Hx of kidney stone: none    Vaginal dryness/irritation: occasional  Correlation with infection and sexual activity: occasionally. Good hygiene/technique: yes  Diarrhea/constipation: multiple bowels movements in the morning - starts formed but develops some loose stools. Has been making dietary adjustments. Baseline LUTS:  frequency q 2hour  nocturia x 1-2  incontinence: occasional UUI and RANJANA. Post hysterectomy, has bladder repair - Sujit. Previously on methenamine by urogyne at [facility], which helped reduce significantly, has been off for ~1 year. Drinks ~ [employer]" 5 glasses of water daily.      Tobacco hx: former in college, social  Fam h/o  malignancy or stones: none    Scr 0.75 10/20/23   CT 6/2023 negative stones/hydro/mass    HISTORY:  Past Medical History:   Diagnosis Date    Anesthesia complication     Arrhythmia     BACK PAIN     Breast cancer ([facility] [address] 01/09/2017    DCIS    C. difficile diarrhea 06/01/2015    CANCER     skin cancer    Deep vein thrombosis (HCC)     right leg    Esophageal reflux 06/29/2012    Frequent PVCs     GERD     H/O colonoscopy 01/01/2010    H/O endoscopic sinus surgery 09/21/2022    History of blepharoplasty     upper lid w/ excessive skin    Hypercalcemia 11/23/2016    Malignant neoplasm of left female breast ([address] 01/10/2019    Obstructive apnea 08/02/2017    Edward HST AHI 7.4 autoPAP 6-16 Prism    Pulmonary embolism ([facility] [address] Recurrent pulmonary embolism ([facility] [address] 2018    Routine Papanicolaou smear 2009    Skin cancer     Sleep apnea     cpap    Squamous cell carcinoma in situ of skin of neck 10/08/2015    URINARY PROBLEMS     Visual impairment     contacts/glasses      Past Surgical History:   Procedure Laterality Date    CHOLECYSTECTOMY  2019    COLONOSCOPY  2011    due in     [id number]  2009    EACH ADD TOOTH EXTRACTION  2016    HX BREAST CANCER  2017    DCIS    HYSTERECTOMY      LUMPECTOMY LEFT  2017    DCIS-Lumpectomy @ [facility]    NEEDLE BIOPSY LEFT  2016    DCIS    OTHER SURGICAL HISTORY      birch and sling bladder sx, liposuction and tummy tuck    OTHER SURGICAL HISTORY      esophagogastroduodenoscopy    OTHER SURGICAL HISTORY  1992    nasal septal deviation repair    OTHER SURGICAL HISTORY  2013    vaginal lift     OTHER SURGICAL HISTORY  2013    bladder sling     OTHER SURGICAL HISTORY  2023    gallstone removal    PARATHYROIDECTOMY      SINUS SURGERY        TOTAL ABDOM HYSTERECTOMY      VAGINAL HYSTERECTOMY        Family History   Problem Relation Age of Onset    Breast Cancer Maternal Aunt 46        53    Other (Other) Father     Other (partkinson's disease) Father     Heart Disorder Mother     Dementia Mother     Other (Other) Mother     Other (Coronary Artery Disease) Mother     Cancer Other     Diabetes Sister         type 2      Social History:   Social History     Socioeconomic History    Marital status:    Tobacco Use    Smoking status: Former     Packs/day: 1     Types: Cigarettes     Start date: 1966     Quit date: 2/15/1984     Years since quittin.7    Smokeless tobacco: Never    Tobacco comments:     smoked for 14years    Vaping Use    Vaping Use: Never used   Substance and Sexual Activity    Alcohol use: Not Currently    Drug use: No   Other Topics Concern    Caffeine Concern Yes     Comment: 3 a week     Exercise Yes Comment: 3 x a week    Social Determinants of Health  Financial Resource Strain: Low Risk  (6/20/2023)      Financial Resource Strain          Difficulty of Paying Living Expenses: Not very hard          Med Affordability: No  Transportation Needs: No Transportation Needs (6/20/2023)      Transportation Needs          Lack of Transportation: No     Allergies    Cefuroxime              HIVES, OTHER (SEE COMMENTS), RASH    Comment:Oral  Levofloxacin            MYALGIA    Comment:Leg cramps  Vancomycin              RASH, ITCHING  Propofol                HALLUCINATION  Sulfa Drugs Cross R*    RASH, NAUSEA AND VOMITING  Bacitracin-Polymyxi*    OTHER (SEE COMMENTS)    Comment:External  Adhesive Tape           RASH  Latex                   RASH, OTHER (SEE COMMENTS)    Comment:Patient states no latex catheters, no latex tape             but latex gloves are ok  Lecithin                RASH  Vancomycin              RASH, OTHER (SEE COMMENTS)    Comment:Intravenous    Review of Systems:   A 10-point review of systems was completed and is negative other than as noted above. Physical Exam:   [facility] 09/29/1996     GENERAL APPEARANCE: well developed, well nourished, in no acute distress  NEUROLOGIC: no localizing neurologic signs, alert and oriented x 3, converses appropriately  HEAD: atraumatic, normocephalic  EYES: sclera non-icteric  ORAL CAVITY: mucosa moist  NECK/THYROID: no obvious masses or goiter  LUNGS: non-labored breathing  ABDOMEN: soft, nontender, nondistended  No CVAT  EXTREMITIES: warm, well-perfused. No clubbing, cyanosis or edema.   SKIN: no obvious rashes    Results:     Laboratory Data:  Lab Results   Component Value Date    WBC 7.9 08/07/2023    HGB 12.1 08/07/2023    .0 08/07/2023     Lab Results   Component Value Date     08/07/2023    K 3.8 08/07/2023     08/07/2023    CO2 25.0 08/07/2023    BUN 18 08/07/2023    GLU 99 08/07/2023    GFRAA 78 04/25/2022    AST 31 08/07/2023    ALT 24 08/07/2023    TP 7.8 08/07/2023    ALB 3.6 08/07/2023    PHOS 1.5 (L) 06/18/2023    CA 9.3 08/07/2023    MG 2.1 06/18/2023       Urinalysis Results (last three years):  Recent Labs     12/23/20  1613 12/28/20  1535 02/21/22  1625 02/21/22  1639 06/21/23  1124 06/23/23  0936 07/04/23  1423 09/01/23  1621 09/15/23  1442 10/10/23  1137 10/23/23  1323   COLORUR  --  Yellow  --  Yellow Yellow Straw Yellow  --   --   --   --    CLARITY  --  Clear  --  Clear Clear Clear Clear  --   --   --   --    SPECGRAVITY 1.030 1.019 1.025 1.016 1.023 1.004 1.013 1.015 1.020 1.020 1.025   PHURINE  --  5.0 6.5 6.0 6.0 8.0 8.0 6.0 5.5 6.0 5.5   PROUR  --  Negative  --  Negative Negative Negative Negative  --   --   --   --    GLUUR Negative Negative  --  Negative Negative Negative Negative  --   --   --   --    KETUR  --  Negative  --  Negative Trace* Negative Negative  --   --   --   --    BILUR  --  Negative  --  Negative Negative Negative Negative  --   --   --   --    BLOODURINE  --  Negative  --  Small* Moderate* Small* Negative  --   --   --   --    NITRITE  --  Negative Negative Negative Negative Negative Negative Negative Negative Negative Negative   UROBILINOGEN  --  <2.0  --  <2.0 2.0* <2.0 <2.0  --   --   --   --    LEUUR  --  Negative  --  Small* Negative Negative Negative  --   --   --   --    WBCUR  --   --   --  21-50* 1-5 1-5  --   --   --   --   --    RBCUR  --   --   --  6-10* 6-10* 0-2  --   --   --   --   --    BACUR  --   --   --  None Seen None Seen Rare*  --   --   --   --   --        Urine Culture Results (last three years):  Lab Results   Component Value Date    URINECUL >100,000 CFU/ML Escherichia coli (A) 10/10/2023    URINECUL >100,000 CFU/ML Proteus mirabilis (A) 09/15/2023    URINECUL 10,000 - 50,000 CFU/ML Escherichia coli (A) 09/01/2023    URINECUL 10,000 - 50,000 CFU/ML Escherichia coli (A) 02/21/2022    URINECUL No Growth at 18-24 hrs. 12/23/2020    URINECUL No Growth at 18-24 hrs. 11/26/2019    URINECUL >100,000 CFU/ML Escherichia coli (A) 09/23/2019    URINECUL >100,000 CFU/ML Klebsiella pneumoniae (A) 06/01/2018    URINECUL >100,000 CFU/ML Klebsiella pneumoniae (A) 04/11/2018    URINECUL No Growth 1 Day 02/13/2017    URINECUL 50,000 CFU/ML Escherichia coli (A) 02/02/2017    URINECUL >100,000 CFU/ML Klebsiella pneumoniae (A) 05/28/2016    URINECUL 50,000 CFU/ML Escherichia coli  ESBL Pos (A) 02/29/2016    URINECUL 20,000 CFU/ML Gram positive bob 01/15/2016    URINECUL >100,000 CFU/ML Escherichia coli (A) 01/03/2016       Imaging  No results found. Impression:     Patient is a 68year old female with hx of pulmonary HTN, HL, COPD, post ERCP pancreatitis, BERNIE, hyperparathyroidism, breast CA, pelvic organ prolapse s/p SCPXY, TVT 2013; Dr. Be Franks, who presents today for consultation from Dr. Sandro Thao office for recurrent UTI. We reviewed cystitis tx and prevention strategies at today's visit and I provided educational materials for this. Discussed dietery and behavioral issues including cranberry / extract pills / supplements, fluids, voiding technique, post coital hygiene. Double voiding, bending over after void to completely empty. I encouraged the patient to drink at least 40-60 oz water per day or enough to keep urine clear. Prior upper tract imaging negative. Had been successfully treated with Methenamine for UTI prevention, recommend resuming. Also option of estrace cream discussed for vaginal atrophy/UTI prevention. Reviewed history of breast CA. Recommendations:  Estrace cream as prescribed  Methenamine 1 gram daily, may increase to BID dosage with UTI symptoms. Standing order for urine culture placed. Follow up in 6 months or sooner, prn. Thank you very much for this consult. Please call if there are any questions or concerns.      Keisha Casas PA-C  Urology  [address]    Date: 10/23/2023

## 2023-12-22 NOTE — TELEPHONE ENCOUNTER
Pt called. Appointment on 10-23-23. Advised would send a standing order for rx. For uti. Pt requesting the name of the rx.   Please call pt

## 2023-12-22 NOTE — TELEPHONE ENCOUNTER
This encounter is now closed. RN called patient. She reports burning sensation when urinating and frequency. Asking for cx order. Rn explained she has 3 standing orders. May go to any [facility] lab. RN also asked for a urine sample, push fluids, attend hygiene promptly, change pads/undergarments often, double void, avoid bladder irritants and may take AZO while waiting for the cx result. She agreed to plans and verbalized understanding.

## 2024-01-03 PROBLEM — E21.3 HYPERPARATHYROIDISM (HCC): Status: ACTIVE | Noted: 2024-01-03

## 2024-01-03 PROBLEM — K74.60 HEPATIC CIRRHOSIS, UNSPECIFIED HEPATIC CIRRHOSIS TYPE, UNSPECIFIED WHETHER ASCITES PRESENT (HCC): Status: ACTIVE | Noted: 2024-01-03

## 2024-01-03 PROBLEM — E11.9 TYPE 2 DIABETES MELLITUS WITHOUT COMPLICATION, WITHOUT LONG-TERM CURRENT USE OF INSULIN (HCC): Status: ACTIVE | Noted: 2024-01-03

## 2024-01-03 NOTE — PATIENT INSTRUCTIONS
RSV shot.   Continue current meds.   Watch diet for fats and carbs.   Stay active.    Call [phone] to schedule bone density scan and ankle-brachial index.  Do fasting blood work.  Schedule medication check in 6 months.        Laura Mccarty's SCREENING SCHEDULE   Tests on this list are recommended by your physician but may not be covered, or covered at this frequency, by your insurer.   Please check with your insurance carrier before scheduling to verify coverage.   PREVENTATIVE SERVICES FREQUENCY &  COVERAGE DETAILS LAST COMPLETION DATE   Diabetes Screening    Fasting Blood Sugar /  Glucose    One screening every 12 months if never tested or if previously tested but not diagnosed with pre-diabetes   One screening every 6 months if diagnosed with pre-diabetes Lab Results   Component Value Date    GLU 99 08/07/2023        Cardiovascular Disease Screening    Lipid Panel  Cholesterol  Lipoprotein (HDL)  Triglycerides Covered every 5 years for all Medicare beneficiaries without apparent signs or symptoms of cardiovascular disease Lab Results   Component Value Date    CHOLEST 166 04/25/2022    HDL 35 (L) 04/25/2022    LDL 90 04/25/2022    TRIG 242 (H) 04/25/2022         Electrocardiogram (EKG)   Covered if needed at Welcome to Medicare, and non-screening if indicated for medical reasons 07/05/2023      Ultrasound Screening for Abdominal Aortic Aneurysm (AAA) Covered once in a lifetime for one of the following risk factors    Men who are 65-75 years old and have ever smoked    Anyone with a family history -     Colorectal Cancer Screening  Covered for ages 50-85; only need ONE of the following:    Colonoscopy   Covered every 10 years    Covered every 2 years if patient is at high risk or previous colonoscopy was abnormal 01/11/2021    Health Maintenance   Topic Date Due    Colorectal Cancer Screening  01/11/2024       Flexible Sigmoidoscopy   Covered every 4 years -    Fecal Occult Blood Test Covered annually -    Bone Density Screening    Bone density screening    Covered every 2 years after age 65 if diagnosed with risk of osteoporosis or estrogen deficiency.    Covered yearly for long-term glucocorticoid medication use (Steroids) Last Dexa Scan:    XR DEXA BONE DENSITOMETRY (CPT=77080) 08/07/2019      No recommendations at this time   Pap and Pelvic    Pap   Covered every 2 years for women at normal risk; Annually if at high risk -  No recommendations at this time    Chlamydia Annually if high risk -  No recommendations at this time   Screening Mammogram    Mammogram     Recommend annually for all female patients aged 40 and older    One baseline mammogram covered for patients aged 35-39 02/07/2023    Health Maintenance   Topic Date Due    Mammogram  Discontinued       Immunizations    Influenza Covered once per flu season  Please get every year 10/07/2023  No recommendations at this time    Pneumococcal Each vaccine ([id number] & [id number]) covered once after 65 Prevnar 13: 10/12/2017    [id number]: 10/01/2014     No recommendations at this time    Hepatitis B One screening covered for patients with certain risk factors   -  No recommendations at this time    Tetanus Toxoid Not covered by Medicare Part B unless medically necessary (cut with metal); may be covered with your pharmacy prescription benefits -    Tetanus, Diptheria and Pertusis TD and TDaP Not covered by Medicare Part B -  No recommendations at this time    Zoster Not covered by Medicare Part B; may be covered with your pharmacy  prescription benefits 10/17/2012  No recommendations at this time     Diabetes      Hemoglobin A1C Annually; if last result is elevated, may be asked to retest more frequently.    Medicare covers every 3 months Lab Results   Component Value Date     (H) 04/25/2022    A1C 6.6 (H) 04/25/2022       Hemoglobin A1C Test due on 10/25/2022    Creat/alb ratio Annually Lab Results   Component Value Date    MICROALBCREA 6.3  04/25/2022       LDL Annually Lab Results   Component Value Date    LDL 90 04/25/2022       Dilated Eye Exam Annually [unfilled]     Annual Monitoring of Persistent Medications (ACE/ARB, digoxin diuretics, anticonvulsants)    Potassium Annually Lab Results   Component Value Date    K 3.8 08/07/2023         Creatinine   Annually Lab Results   Component Value Date    CREATSERUM 0.99 08/07/2023         BUN Annually Lab Results   Component Value Date    BUN 18 08/07/2023       Drug Serum Conc Annually No results found for: \"DIGOXIN\", \"DIG\", \"VALP\"         Chronic Obstructive Pulmonary Disease (COPD)    Spirometry Annually Spirometry date: 04/07/2017

## 2024-01-03 NOTE — PROGRESS NOTES
Subjective:   Laura Mccarty is a 77 year old female who presents for a MA (Medicare Advantage) Supervisit (Once per calendar year) and also go over chronic medical problems esophageal reflux, chronic kidney disease, leg cramps, lymphedema, frequent PVCs, recurrent UTI history of gallstones, history of DCIS.    Patient is new to establish care.  Patient previously saw Dr. Hua.  Diabetes type 2 diet controlled patient is doing well. Continue monitoring.  Blood work ordered.  Foot examination done today.    Patient was diagnosed with left breast DCIS.  She is seeing .  Doing well mammogram will be due beginning of 2024.    Patient was diagnosed with primary hyperparathyroidism in March 2023 she had 2 of the parathyroid glands removed.  She had hyperglycemia which led to her evaluation.  Right now she is doing well.  She would like calcium to be checked order was placed for the blood work.    Patient diagnosed with COPD.  She saw Dr. Lauren.  Was using Breo inhaler.  Right now she is doing well.  Noticed however when walking up the stairs she gets more short of breath.  Recommended to try Breo for a month and see if there is any improvement in her symptoms.    Patient has obstructive sleep apnea on CPAP machine.  Recommended follow-up with Dr. Lauren.    GERD patient is seeing Dr. Pina on pantoprazole doing well medication helps with controlling symptoms.    History of multiple pulmonary embolisms.  She is on Xarelto seeing Dr. Hall.    Hyperlipidemia patient is taking fenofibrate doing well.  Blood work will be rechecked.    Patient was diagnosed with lymphedema bilateral lower legs right is getting more swollen than the left leg.  Gets some cramps.  Will do ankle-brachial index test.  Slightly diminished pulses in the feet.  Not consistently wearing compressive stockings.    Patient with a history of gallstones in the biliary ducts they were removed had acute pancreatitis right now doing well.   Monitored by GI.    Patient is seeing cardiologist for frequent PVCs and mild sick sinus syndrome.  She is seeing Dr. Jama and Dr. Galeana electrophysiologist.      History/Other:   Fall Risk Assessment:   She has been screened for Falls and is low risk.      Cognitive Assessment:   She had a completely normal cognitive assessment - see flowsheet entries     Functional Ability/Status:   Laura Mccarty has some abnormal functions as listed below:  She has problems with Memory based on screening of functional status.       Depression Screening (PHQ-2/PHQ-9): PHQ-2 SCORE: 0, done 1/3/2024       Advanced Directives:   She has a Living Will on file in [employer]; reviewed and discussed documents with patient (and family/surrogate if present).  She has a Power of  for Health Care on file in [employer].  Discussed Advance Care Planning with patient (and family/surrogate if present). Standard forms made available to patient in After Visit Summary.      Patient Active Problem List   Diagnosis   • Esophageal reflux   • Pure hyperglyceridemia   • Fear of flying   • Frequent PVCs   • Personal history of pulmonary embolism   • COPD, mild (HCC)   • BERNIE (obstructive sleep apnea)   • Overweight   • Mixed incontinence   • Female genital prolapse   • Personal history of other malignant neoplasm of skin   • Recurrent pulmonary embolism (HCC)   • Pulmonary hypertension (HCC)   • Abdominal pain   • Post-ERCP acute pancreatitis   • CKD (chronic kidney disease) stage 3, GFR 30-59 ml/min (HCC)   • Hepatic cirrhosis, unspecified hepatic cirrhosis type, unspecified whether ascites present (HCC)   • Hyperparathyroidism (HCC)   • Type 2 diabetes mellitus without complication, without long-term current use of insulin (HCC)     Allergies:  She is allergic to cefuroxime, levofloxacin, vancomycin, propofol, sulfa drugs cross reactors, bacitracin-polymyxin b, adhesive tape, latex, lecithin, and vancomycin.    Current Medications:  Outpatient  Medications Marked as Taking for the 1/3/24 encounter (Office Visit) with Codi Mirza MD   Medication Sig   • fenofibrate 145 MG Oral Tab Take 1 tablet (145 mg total) by mouth daily.   • furosemide 20 MG Oral Tab Take 1 tablet (20 mg total) by mouth 3 (three) times a week.   • estradiol (ESTRACE) 0.1 MG/GM Vaginal Cream Apply a small (pea-sized) amount to periurethral region daily for two weeks and three times weekly thereafter   • methenamine 1 g Oral Tab Take 1 tablet (1 g total) by mouth 2 (two) times daily.   • loperamide 2 MG Oral Cap Take 1 capsule (2 mg total) by mouth as needed for Diarrhea.   • VITAMIN D, CHOLECALCIFEROL, OR Take by mouth daily.   • XARELTO 20 MG Oral Tab Take 1 tablet (20 mg total) by mouth daily with food.   • ALBUTEROL 108 (90 Base) MCG/ACT Inhalation Aero Soln INHALE 2 PUFFS INTO THE LUNGS EVERY 4 TO 6 HOURS AS NEEDED FOR WHEEZING   • metroNIDAZOLE 0.75 % External Lotion APPLY SMALL AMOUNT TO FULL FACE ONCE DAILY AFTER CLEANSING   • donepezil 5 MG Oral Tab Take 1 tablet (5 mg total) by mouth nightly.   • Niacin (VITAMIN B-3 OR) Take 1 tablet by mouth daily.   • BREO ELLIPTA 100-25 MCG/INH Inhalation Aerosol Powder, Breath Activated TAKE 1 PUFF BY MOUTH EVERY DAY (Patient taking differently: Inhale 1 puff into the lungs daily as needed.)   • PANTOPRAZOLE 40 MG Oral Tab EC TAKE 1 TABLET (40 MG TOTAL) BY MOUTH 2 (TWO) TIMES DAILY BEFORE MEALS.   • Fluticasone Propionate 50 MCG/ACT Nasal Suspension 2 sprays by Each Nare route daily. (Patient taking differently: 2 sprays by Each Nare route daily as needed.)   • Glucos-Chond-Hyal Ac-Ca Fructo (MOVE FREE [facility] HEALTH ADVANCE) Oral Tab Take 1 tablet by mouth daily.   • Metoprolol Succinate (TOPROL XL) 50 MG Oral Tablet SR 24 Hr Take 1 tablet (50 mg total) by mouth nightly.       Medical History:  She  has a past medical history of Anesthesia complication, Arrhythmia, BACK PAIN, Breast cancer (HCC) (01/09/2017), C. 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  Surgical History:  She  has a past surgical history that includes hysterectomy; dexa,bone density,axial skeleton (02/21/2009); sinus surgery  ; vaginal hysterectomy; colonoscopy (2011); other surgical history; other surgical history; other surgical history (01/01/1992); other surgical history (06/2013); other surgical history (07/2013); total abdom hysterectomy; each add tooth extraction (05/16/2016); hx breast cancer (01/06/2017); needle biopsy left (12/08/2016); cholecystectomy (01/22/2019); lumpectomy left (01/06/2017); parathyroidectomy; and other surgical history (06/23/2023).   Family History:  Her family history includes Breast Cancer (age of onset: 55) in her maternal aunt; Cancer in an other family member; Coronary Artery Disease in her mother; Dementia in her mother; Diabetes in her sister; Heart Disorder in her mother; Other in her father and mother; partkinson's disease in her father.  Social History:  She  reports that she quit smoking about 39 years ago. Her smoking use included cigarettes. She started smoking about 57 years ago. She smoked an average of 1 pack per day. She has never used smokeless tobacco. She reports that she does not currently use alcohol. She reports that she does not use drugs.    Tobacco:  She smoked tobacco in the past  but quit greater than 12 months ago.  Social History    Tobacco Use      Smoking status: Former        Packs/day: 1        Types: Cigarettes        Start date: 1966        Quit date: 2/15/1984        Years since quittin.9      Smokeless tobacco: Never      Tobacco comments: smoked for 14years            CAGE Alcohol Screen:   CAGE screening score of 0 on 1/3/2024, showing low risk of alcohol abuse.      Patient Care Team:  Codi Mirza MD as PCP - General (Family Medicine)  Federico Pina MD (GASTROENTEROLOGY)  Stephani Harrell PT as Physical Therapist  Tai Galeana MD (Cardiac Electrophysiology)  Robinson Padron MD (CARDIOLOGY)    Review of Systems  GENERAL: feels well otherwise  SKIN: denies any unusual skin lesions  EYES: denies blurred vision or double vision  HEENT: denies nasal congestion, sinus pain or ST  LUNGS: denies shortness of breath with exertion  CARDIOVASCULAR: denies chest pain on exertion  GI: denies abdominal pain, denies heartburn  : denies dysuria, vaginal discharge or itching, no complaint of urinary incontinence   MUSCULOSKELETAL: denies back pain, cramps feet  NEURO: denies headaches  PSYCHE: denies depression or anxiety  HEMATOLOGIC: denies hx of anemia  ENDOCRINE: denies thyroid history  ALL/ASTHMA: denies hx of allergy or asthma    Objective:   Physical Exam  General Appearance:  Alert, cooperative, no distress, appears stated age   Head:  Normocephalic, without obvious abnormality, atraumatic   Eyes:  PERRL, conjunctiva/corneas clear, EOM's intact both eyes   Ears:  Normal TM's and external ear canals, both ears   Nose: Nares normal, septum midline,mucosa normal, no drainage or sinus tenderness   Throat: Lips, mucosa, and tongue normal; teeth and gums normal   Neck: Supple, symmetrical, trachea midline, no adenopathy;  thyroid: not enlarged, symmetric, no tenderness/mass/nodules; no carotid bruit or JVD   Back:   Symmetric, no curvature, ROM normal, no CVA  tenderness   Lungs:   Clear to auscultation bilaterally, respirations unlabored   Heart:  Regular rate and rhythm, S1 and S2 normal, no murmur, rub, or gallop   Abdomen:   Soft, non-tender, bowel sounds active all four quadrants,  no masses, no organomegaly   Pelvic: Deferred  Breast examination deferred   Extremities: Extremities normal, atraumatic, no cyanosis , +1 edema bilateral lower legs    Pulses: 2+ and symmetric   Skin: Skin color, texture, turgor normal, no rashes or lesions   Lymph nodes: Cervical, supraclavicular, and axillary nodes normal   Neurologic: Normal   Bilateral barefoot skin diabetic exam is normal, visualized feet with osteoarthritis changes in the feet some discoloration of the skin,  Bilateral monofilament/sensation of both feet is normal.  Pulsation pedal pulse exam of both lower legs/feet-slightly decreased        /60 (BP Location: Left arm, Patient Position: Sitting, Cuff Size: adult)   Pulse 64   Temp 97.3 °F (36.3 °C) (Temporal)   Resp 14   Ht 5' 6\" (1.676 m)   Wt 144 lb (65.3 kg)   LMP 09/29/1996   BMI 23.24 kg/m²  Estimated body mass index is 23.24 kg/m² as calculated from the following:    Height as of this encounter: 5' 6\" (1.676 m).    Weight as of this encounter: 144 lb (65.3 kg).    Medicare Hearing Assessment:   Hearing Screening    Time taken: 1/3/2024  1:18 PM  Screening Method: Finger Rub             Visual Acuity:   Right Eye Visual Acuity: Uncorrected Right Eye Chart Acuity: 20/70   Left Eye Visual Acuity: Uncorrected Left Eye Chart Acuity: 20/30   Both Eyes Visual Acuity: Uncorrected Both Eyes Chart Acuity: 20/30   Able To Tolerate Visual Acuity: Yes      Results for orders placed or performed in visit on 12/22/23   Urine Culture, Routine    Specimen: Urine, clean catch   Result Value Ref Range    Urine Culture No Growth 2 Days      Assessment & Plan:   Laura Mccarty is a 77 year old female who presents for a Medicare Assessment.     1. Encounter for annual  health examination (Primary)  2. Gastroesophageal reflux disease without esophagitis  3. Hepatic cirrhosis, unspecified hepatic cirrhosis type, unspecified whether ascites present (HCC)  4. Hyperparathyroidism (HCC)  5. Type 2 diabetes mellitus without complication, without long-term current use of insulin (HCC)  -     Hemoglobin A1C; Future; Expected date: 01/03/2024  -     Microalb/Creat Ratio, Random Urine; Future; Expected date: 01/03/2024  6. COPD, mild (HCC)  7. Pulmonary hypertension (HCC)  8. Stage 3a chronic kidney disease (HCC)  9. Recurrent pulmonary embolism (HCC)  10. Screen for colon cancer  11. Postmenopausal  -     XR DEXA BONE DENSITOMETRY (CPT=77080); Future; Expected date: 01/03/2024  12. Hypertriglyceridemia  -     CBC With Differential With Platelet; Future; Expected date: 01/03/2024  -     Comp Metabolic Panel (14); Future; Expected date: 01/03/2024  -     Lipid Panel; Future; Expected date: 01/03/2024  -     Urinalysis with Culture Reflex; Future; Expected date: 01/03/2024  13. Diminished pulses in lower extremity  -     US ANKLE BRACHIAL INDEX (CPT=93922); Future; Expected date: 01/03/2024  -     Magnesium; Future; Expected date: 01/03/2024  -     TSH W Reflex To Free T4; Future; Expected date: 01/03/2024  14. Leg cramps  -     Magnesium; Future; Expected date: 01/03/2024  -     TSH W Reflex To Free T4; Future; Expected date: 01/03/2024  15. Recurrent UTI  16. Gall stones, common bile duct  17. Lymphedema of both lower extremities  18. Frequent PVCs  19. BERNIE (obstructive sleep apnea)  20. History of ductal carcinoma in situ (DCIS) of breast  21. SSS (sick sinus syndrome) (HCC)    Gastroesophageal reflux disease on medication stable doing well    Hepatic cirrhosis seen specialist monitor by them    Hyperparathyroidism monitored by specialist status post thyroidectomy monitor    Type 2 diabetes diet controlled check blood work for recommendation pending test results.    COPD mild patient is not  using any medications recommended trial of Breo and see if that would help with her symptoms    Pulmonary hypertension on CPAP machine monitor.    Stage III chronic kidney disease monitor blood work for recommendation pending test results.    Required from an embolism on Xarelto doing well seeing specialist    Screen for colon cancer seeing GI doctor management by them    Postmenopausal check DEXA scan further recommendation pending test result    Hypertriglyceridemia on medication stable she will call for refill if needed monitor.    Diminished pulses in the lower extremity check ankle-brachial index further recommendation pending test result    Leg cramps check blood work also test for circulation in the legs monitor.    Recurrent UTI seeing specialist stable    Gallstone in the bile duct they were removed monitor per GI doctor    Lymphedema wear compressive stockings more regularly continue diuretic monitor.    Frequent PVCs patient is seeing cardiologist management by them    Obstructive sleep apnea on CPAP machine recommended follow-up with Dr. Lauren    History of DCIS stable monitor.    Sick sinus syndrome seeing electrophysiologist monitored by them.      RSV shot.   Continue current meds.   Watch diet for fats and carbs.   Stay active.    Call [phone] to schedule bone density scan and ankle-brachial index.  Do fasting blood work.  Schedule medication check in 6 months.    Imaging studies ordered.  Lab work ordered.  Reinforced healthy diet, lifestyle, and exercise.      Return in about 6 months (around 7/3/2024).   Time spent was 60 minutes on visit and documentation. , from that 15  minutes spent on super visit 45 minutes spent on  reviewing patient's list of concerns and follow-up on medical problems.    Codi Mirza MD, 1/3/2024     Supplementary Documentation:   General Health:  In the past six months, have you lost more than 10 pounds without trying?: 2 - No  Has your appetite been poor?:  No  Type of Diet: Balanced  How does the patient maintain a good energy level?: Daily Walks  How would you describe your daily physical activity?: Light  How would you describe your current health state?: Good  How do you maintain positive mental well-being?: Visiting Friends;Visiting Family  On a scale of 0 to 10, with 0 being no pain and 10 being severe pain, what is your pain level?: 0 - (None)  In the past six months, have you experienced urine leakage?: 0-No  At any time do you feel concerned for the safety/well-being of yourself and/or your children, in your home or elsewhere?: No  Have you had any immunizations at another office such as Influenza, Hepatitis B, Tetanus, or Pneumococcal?: No         Laura Mccarty's SCREENING SCHEDULE   Tests on this list are recommended by your physician but may not be covered, or covered at this frequency, by your insurer.   Please check with your insurance carrier before scheduling to verify coverage.   PREVENTATIVE SERVICES FREQUENCY &  COVERAGE DETAILS LAST COMPLETION DATE   Diabetes Screening    Fasting Blood Sugar /  Glucose    One screening every 12 months if never tested or if previously tested but not diagnosed with pre-diabetes   One screening every 6 months if diagnosed with pre-diabetes Lab Results   Component Value Date    GLU 99 08/07/2023        Cardiovascular Disease Screening    Lipid Panel  Cholesterol  Lipoprotein (HDL)  Triglycerides Covered every 5 years for all Medicare beneficiaries without apparent signs or symptoms of cardiovascular disease Lab Results   Component Value Date    CHOLEST 166 04/25/2022    HDL 35 (L) 04/25/2022    LDL 90 04/25/2022    TRIG 242 (H) 04/25/2022         Electrocardiogram (EKG)   Covered if needed at Welcome to Medicare, and non-screening if indicated for medical reasons 07/05/2023      Ultrasound Screening for Abdominal Aortic Aneurysm (AAA) Covered once in a lifetime for one of the following risk factors   • Men who are  65-75 years old and have ever smoked   • Anyone with a family history -     Colorectal Cancer Screening  Covered for ages 50-85; only need ONE of the following:    Colonoscopy   Covered every 10 years    Covered every 2 years if patient is at high risk or previous colonoscopy was abnormal 01/11/2021    Health Maintenance   Topic Date Due   • Colorectal Cancer Screening  01/11/2024       Flexible Sigmoidoscopy   Covered every 4 years -    Fecal Occult Blood Test Covered annually -   Bone Density Screening    Bone density screening    Covered every 2 years after age 65 if diagnosed with risk of osteoporosis or estrogen deficiency.    Covered yearly for long-term glucocorticoid medication use (Steroids) Last Dexa Scan:    XR DEXA BONE DENSITOMETRY (CPT=77080) 08/07/2019      No recommendations at this time   Pap and Pelvic    Pap   Covered every 2 years for women at normal risk; Annually if at high risk -  No recommendations at this time    Chlamydia Annually if high risk -  No recommendations at this time   Screening Mammogram    Mammogram     Recommend annually for all female patients aged 40 and older    One baseline mammogram covered for patients aged 35-39 02/07/2023    Health Maintenance   Topic Date Due   • Mammogram  Discontinued       Immunizations    Influenza Covered once per flu season  Please get every year 10/07/2023  No recommendations at this time    Pneumococcal Each vaccine ([id number] & [id number]) covered once after 65 Prevnar 13: 10/12/2017    [id number]: 10/01/2014     No recommendations at this time    Hepatitis B One screening covered for patients with certain risk factors   -  No recommendations at this time    Tetanus Toxoid Not covered by Medicare Part B unless medically necessary (cut with metal); may be covered with your pharmacy prescription benefits -    Tetanus, Diptheria and Pertusis TD and TDaP Not covered by Medicare Part B -  No recommendations at this time    Zoster Not covered by  Medicare Part B; may be covered with your pharmacy  prescription benefits 10/17/2012  No recommendations at this time       Diabetes      Hemoglobin A1C Annually; if last result is elevated, may be asked to retest more frequently.    Medicare covers every 3 months Lab Results   Component Value Date     (H) 04/25/2022    A1C 6.6 (H) 04/25/2022       Hemoglobin A1C Test due on 10/25/2022    Creat/alb ratio Annually Lab Results   Component Value Date    MICROALBCREA 6.3 04/25/2022       LDL Annually Lab Results   Component Value Date    LDL 90 04/25/2022       Dilated Eye Exam Annually Last Diabetic Eye Exam:  No data recorded  No data recorded         Annual Monitoring of Persistent Medications (ACE/ARB, digoxin diuretics, anticonvulsants)    Potassium Annually Lab Results   Component Value Date    K 3.8 08/07/2023         Creatinine   Annually Lab Results   Component Value Date    CREATSERUM 0.99 08/07/2023         BUN Annually Lab Results   Component Value Date    BUN 18 08/07/2023       Drug Serum Conc Annually No results found for: \"DIGOXIN\", \"DIG\", \"VALP\"           Chronic Obstructive Pulmonary Disease (COPD)    Spirometry Annually Spirometry date: 04/07/2017

## 2024-01-09 NOTE — TELEPHONE ENCOUNTER
01/09 LMTCB    Please tell pt we sent over a 90 day supply with 1 refill for her fenofibrate. She did not get a 90 day supply at her last refill because she was due for an appt. She is due to be seen again in July. We do not refill medications for 1 year. The longest we will refill medications if she meets the refill protocol is 6 months.

## 2024-01-09 NOTE — TELEPHONE ENCOUNTER
Pt states she needs 3 month refill of fenofibrate sent to pharmacy or else she is charged more for rx by insurance company. Pt states that pharmacy sent over request but has not heard back in 3-4 days. Pt is requesting that refills are placed for the next year so she does not have to call us each time for refill. Pt requesting call back from nurse to confirm.    Rx refill for:  fenofibrate 145 MG Oral Tab 30 tablet     Rx to:  [id number] - [place], [place] - [address] AT [address], [phone], [phone] [[id number]]

## 2024-01-09 NOTE — TELEPHONE ENCOUNTER
Pt called back. I informed her the fenofibrate was sent to the pharmacy for a 90 day w/ 1 refill. She is due to be seen again in July. I informed her we do not refill medications for 1 year. Pt. Agreed to plan and verbalized understanding

## 2024-01-16 NOTE — TELEPHONE ENCOUNTER
I called the pt. On her HPPA from she gives authorization to leave a message on her cell number. Message left for her to try Allegra 180 mg 1 daily ( OTC) and see if that will help her symptoms.

## 2024-01-16 NOTE — TELEPHONE ENCOUNTER
Have patient to use Allegra 180 mg 1 tablet daily over-the-counter and see if that would help of her symptoms.

## 2024-01-16 NOTE — TELEPHONE ENCOUNTER
Pt states she has runny nose for two months clear no color, eyes tearing up also for two months. Now sneezing a lot also clear. No fever, does not feel sick. Wants to know if it is allergies and if there is something she can take

## 2024-01-18 NOTE — TELEPHONE ENCOUNTER
Pt's  calling stating that an appeal needs to be sent for   ALBUTEROL 108 (90 Base) MCG/ACT Inhalation Aero Soln     Pt's  stated that the insurance company reached out to us but there has been no response so Dr. Mirza has to file an appeal for tier exception for this rx    Phone number: [phone] Option 2       Pt would like a call back when this has been completed.

## 2024-01-18 NOTE — TELEPHONE ENCOUNTER
I called and spoke to pt.s . ( He is on hr HIPAA) She has not had albuterol inhaler for over 1 year. If she feels like she needs to have this inhaler again she needs to contact pulmonology. They have treated her in the past for the COPD. She also gets her Breo inhaler from the. Spouse agreed to plan and verbalized understanding.

## 2024-04-01 NOTE — TELEPHONE ENCOUNTER
From: Adam Rios  To: Yanira Osborn MD  Sent: 5/4/2021 9:37 AM CDT  Subject: Prescription Question    I do need new prescriptions for Klor-Con M10 tablets. 1 tablet 3 to 4 times a week.  Three month supply with 3 refills    Furosemide 20 MG tablet [FreeTextEntry1] : 9y/o M with abdominal pain a/w eating anything but fast food and liquids.  Exam unremarkable.  Unclear origin, possibly related to anxiety.  - H. pylori stool test - Consider miralax if not stooling regularly - Recommend mom speaks with therapist and psychiatrist about increasing anxiety - Return/ED precautions given.  RTC routine and prn.  Caretaker expressed understanding and all questions answered.

## 2024-04-23 NOTE — PROGRESS NOTES
Subjective:   77 year old female with hx of pulmonary HTN, HL, COPD, post ERCP pancreatitis, BERNIE, hyperparathyroidism, breast CA, pelvic organ prolapse s/p SCPXY, TVT 2013; Dr. Quiros, who presents today for follow up Rashi.    Seen in October 2023 for consultation. Upper tract imaging was negative. Recommended resuming Methenamine with increase to BID for UTI symptoms. She was also started on estrace.     Patient notes that she has had some intermittent dysuria and urinary frequency, she pushes cranberry and then takes a few antibiotics that are left over and symptoms would resolve within 24 hours. Typically experiencing every few weeks.     Believes she drinks a lot but unsure how much, has water, iced tea, lemonade.     Last urine culture 12/22/23 negative. Last positive infection E Coli.   UA trace blood, trace leuks.    GI- has colonoscopy later this month. Periodic abdominal pain.     History/Other:    Chief Complaint Reviewed and Verified  Nursing Notes Reviewed and   Verified  Allergies Reviewed  Medications Reviewed         Current Outpatient Medications   Medication Sig Dispense Refill    NON FORMULARY SM42 Cholesterol 2%/Lovastatin 2% ointment. Apply to lower legs 2 times per day      fenofibrate 145 MG Oral Tab Take 1 tablet (145 mg total) by mouth daily. 90 tablet 1    furosemide 20 MG Oral Tab Take 1 tablet (20 mg total) by mouth 3 (three) times a week.      estradiol (ESTRACE) 0.1 MG/GM Vaginal Cream Apply a small (pea-sized) amount to periurethral region daily for two weeks and three times weekly thereafter 42.5 g 5    methenamine 1 g Oral Tab Take 1 tablet (1 g total) by mouth 2 (two) times daily. (Patient taking differently: Take 1 tablet (1 g total) by mouth daily.) 180 tablet 3    loperamide 2 MG Oral Cap Take 1 capsule (2 mg total) by mouth as needed for Diarrhea.      VITAMIN D, CHOLECALCIFEROL, OR Take by mouth daily.      XARELTO 20 MG Oral Tab Take 1 tablet (20 mg total) by mouth daily  with food. (Patient taking differently: Take 1 tablet (20 mg total) by mouth daily with food.) 90 tablet 3    ALBUTEROL 108 (90 Base) MCG/ACT Inhalation Aero Soln INHALE 2 PUFFS INTO THE LUNGS EVERY 4 TO 6 HOURS AS NEEDED FOR WHEEZING 6.7 each 0    metroNIDAZOLE 0.75 % External Lotion APPLY SMALL AMOUNT TO FULL FACE ONCE DAILY AFTER CLEANSING      donepezil 5 MG Oral Tab Take 1 tablet (5 mg total) by mouth nightly.      Niacin (VITAMIN B-3 OR) Take 1 tablet by mouth daily.      BREO ELLIPTA 100-25 MCG/INH Inhalation Aerosol Powder, Breath Activated TAKE 1 PUFF BY MOUTH EVERY DAY (Patient taking differently: Inhale 1 puff into the lungs daily as needed.) 3 each 0    PANTOPRAZOLE 40 MG Oral Tab EC TAKE 1 TABLET (40 MG TOTAL) BY MOUTH 2 (TWO) TIMES DAILY BEFORE MEALS. 180 tablet 1    Fluticasone Propionate 50 MCG/ACT Nasal Suspension 2 sprays by Each Nare route daily. (Patient taking differently: 2 sprays by Each Nare route daily as needed.) 3 Bottle 3    Glucos-Chond-Hyal Ac-Ca Fructo (MOVE FREE [employer] HEALTH ADVANCE) Oral Tab Take 1 tablet by mouth daily.      Metoprolol Succinate (TOPROL XL) 50 MG Oral Tablet SR 24 Hr Take 1 tablet (50 mg total) by mouth nightly.         Review of Systems:  Pertinent items are noted in HPI.      Objective:   LMP 09/29/1996  Estimated body mass index is 23.24 kg/m² as calculated from the following:    Height as of 4/5/24: 5' 6\" (1.676 m).    Weight as of 4/5/24: 144 lb (65.3 kg).    GENERAL APPEARANCE: a well-developed, well-nourished, in no apparent distress. A&O x 3  Non labored respirations      Laboratory Data:  Lab Results   Component Value Date    WBC 7.0 01/15/2024    HGB 13.1 01/15/2024    .0 01/15/2024     Lab Results   Component Value Date     01/15/2024    K 4.0 01/15/2024     (H) 01/15/2024    CO2 26.0 01/15/2024    BUN 16 01/15/2024    GLU 90 01/15/2024    GFRAA 78 04/25/2022    AST 27 01/15/2024    ALT 24 01/15/2024    TP 7.8 01/15/2024    ALB 4.0  01/15/2024    PHOS 1.5 (L) 06/18/2023    CA 8.9 01/15/2024    MG 1.9 01/15/2024       Urinalysis Results (last three years):  Recent Labs     02/21/22  1625 02/21/22  1639 06/21/23  1124 06/23/23  0936 07/04/23  1423 09/01/23  1621 09/15/23  1442 10/10/23  1137 10/23/23  1323 10/23/23  1454 01/15/24  1153   COLORUR  --  Yellow Yellow Straw Yellow  --   --   --   --   --  Light-Yellow   CLARITY  --  Clear Clear Clear Clear  --   --   --   --   --  Clear   SPECGRAVITY 1.025 1.016 1.023 1.004 1.013 1.015 1.020 1.020 1.025  --  1.018   PHURINE 6.5 6.0 6.0 8.0 8.0 6.0 5.5 6.0 5.5  --  5.5   PROUR  --  Negative Negative Negative Negative  --   --   --   --   --  Negative   GLUUR  --  Negative Negative Negative Negative  --   --   --   --   --  Normal   KETUR  --  Negative Trace* Negative Negative  --   --   --   --   --  Negative   BILUR  --  Negative Negative Negative Negative  --   --   --   --   --  Negative   BLOODURINE  --  Small* Moderate* Small* Negative  --   --   --   --   --  Trace*   NITRITE Negative Negative Negative Negative Negative Negative Negative Negative Negative  --  Negative   UROBILINOGEN  --  <2.0 2.0* <2.0 <2.0  --   --   --   --   --  Normal   LEUUR  --  Small* Negative Negative Negative  --   --   --   --   --  Negative   WBCUR  --  21-50* 1-5 1-5  --   --   --   --   --  1-5 1-5   RBCUR  --  6-10* 6-10* 0-2  --   --   --   --   --  None Seen 0-2   BACUR  --  None Seen None Seen Rare*  --   --   --   --   --  Rare* None Seen       Urine Culture Results (last three years):  Lab Results   Component Value Date    URINECUL No Growth 2 Days 12/22/2023    URINECUL >100,000 CFU/ML Escherichia coli (A) 10/10/2023    URINECUL >100,000 CFU/ML Proteus mirabilis (A) 09/15/2023    URINECUL 10,000 - 50,000 CFU/ML Escherichia coli (A) 09/01/2023    URINECUL 10,000 - 50,000 CFU/ML Escherichia coli (A) 02/21/2022    URINECUL No Growth at 18-24 hrs. 12/23/2020    URINECUL No Growth at 18-24 hrs. 11/26/2019     URINECUL >100,000 CFU/ML Escherichia coli (A) 09/23/2019    URINECUL >100,000 CFU/ML Klebsiella pneumoniae (A) 06/01/2018    URINECUL >100,000 CFU/ML Klebsiella pneumoniae (A) 04/11/2018    URINECUL No Growth 1 Day 02/13/2017    URINECUL 50,000 CFU/ML Escherichia coli (A) 02/02/2017    URINECUL >100,000 CFU/ML Klebsiella pneumoniae (A) 05/28/2016    URINECUL 50,000 CFU/ML Escherichia coli  ESBL Pos (A) 02/29/2016    URINECUL 20,000 CFU/ML Gram positive bob 01/15/2016       Imaging  No results found.    Assessment & Plan:   Recurrent UTI  Vaginal atrophy    Continue Methenamine every day, increase to twice daily prn for UTI symptoms.  Continue estrace cream three times per week  Monitor bladder irritants, increase hydration  Encouraged to drop off urine sample when symptoms arise to identify bacteria and see sensitivities.    Ronel Gottlieb PA-C, 4/23/2024

## 2024-04-25 NOTE — TELEPHONE ENCOUNTER
Hello, patient inquired regarding urine culture, patient states she does have discomfort and would like prescription to clear up infection

## 2024-04-30 NOTE — OPERATIVE REPORT
[facility]  Colonoscopy Report      Laura Mccarty Patient Status:  Hospital Outpatient Surgery    1946 MRN [id number]   [facility] ENDOSCOPY PAIN CENTER Attending Federico Pina MD       DATE OF OPERATION: 2024     PREOPERATIVE DIAGNOSIS:     History of colon polyps    POSTOPERATIVE DIAGNOSIS:     Colon polyps  Diverticulosis     SURGERY PERFORMED:     Colonoscopy, entire colon with cold snare polypectomy    SURGEON: Federico Pina MD    ANESTHESIA: [facility]    HISTORY OF PRESENT ILLNESS:      The patient is a 77 year old female who has a history of colon polyps. Her last colonoscopy was in . Four adenomas were removed.     REPORT OF OPERATION:     The procedure was reviewed with the patient. The patient is aware of the indications. The risks of bleeding, perforation and anesthetic complications have been reviewed. The possibility of missed lesions were reviewed.    After the patient was placed in the left lateral decubitus position,the Olympus video colonoscope was inserted into the rectum and was advanced to the cecum.  The scope was withdrawn and the mucosa was observed for abnormalities.    FINDINGS:    The visualized colonic mucosa reveals three sessile polyps. A 4 mm sessile, proximal ascending polyp was removed using snare polypectomy. Two 3-5 mm sessile, mid-ascending polyps were removed using snare polypectomy. Diverticulosis is noted in the sigmoid colon. The remainder of the colon is normal.  At the anal verge, grade 2 internal hemorrhoids are identified.    The patient tolerated the procedure well without any immediate complications.    Aronchick bowel prep score was 1. (1 - excellent > 95% mucosa seen; 2 - good - clear liquid up to 25% of the mucosa, 90% mucosa seen;  3 - fair - semisolid stool not suctioned, but 90% of the mucosa seen; 4 - poor - semisolid stool not suctioned, but < 90% mucosa seen; 5 - inadequate - repeat prep needed)     The colon withdrawal time was 8  minutes.      PLAN:    Await histology.   Resume Xarelto in 2 days.

## 2024-04-30 NOTE — ANESTHESIA POSTPROCEDURE EVALUATION
[facility]    Laura Mccarty Patient Status:  Hospital Outpatient Surgery   Age/Gender 77 year old female MRN [id number]   Location [facility] ENDOSCOPY PAIN CENTER Attending Federico Pina MD   Hosp Day # 0 PCP Codi Mirza MD       Anesthesia Post-op Note    COLONOSCOPY W/ COLD SNARE POLYPECTOMY    Procedure Summary       Date: 04/30/24 Room / Location:  ENDOSCOPY 03 / [facility] ENDOSCOPY    Anesthesia Start: 1545 Anesthesia Stop: 1618    Procedure: COLONOSCOPY W/ COLD SNARE POLYPECTOMY Diagnosis: (COLON POLYPS, DIVERTICULOSIS)    Surgeons: Federico Pina MD Anesthesiologist: Bala Mullins MD    Anesthesia Type: MAC ASA Status: 3            Anesthesia Type: MAC    Vitals Value Taken Time   /53 04/30/24 1620   Temp 98.0 04/30/24 1620   Pulse 73 04/30/24 1620   Resp 16 04/30/24 1620   SpO2 92 % 04/30/24 1620   Vitals shown include unfiled device data.    Patient Location: Endoscopy    Anesthesia Type: MAC    Airway Patency: patent    Postop Pain Control: adequate    Mental Status: mildly sedated but able to meaningfully participate in the post-anesthesia evaluation    Nausea/Vomiting: none    Cardiopulmonary/Hydration status: stable euvolemic    Complications: no apparent anesthesia related complications    Postop vital signs: stable    Dental Exam: Unchanged from Preop    Patient to be discharged home when criteria met.

## 2024-04-30 NOTE — ANESTHESIA PREPROCEDURE EVALUATION
PRE-OP EVALUATION    Patient Name: Laura Mccarty    Admit Diagnosis: HISTORY OF COLON POLYPS    Pre-op Diagnosis: HISTORY OF COLON POLYPS    COLONOSCOPY    Anesthesia Procedure: COLONOSCOPY    Surgeons and Role:     * Federico Pian MD - Primary    Pre-op vitals reviewed.        Body mass index is 23.24 kg/m².    Current medications reviewed.  Hospital Medications:   lactated ringers infusion   Intravenous Continuous       Outpatient Medications:     Medications Prior to Admission   Medication Sig Dispense Refill Last Dose    fenofibrate 145 MG Oral Tab Take 1 tablet (145 mg total) by mouth daily. 90 tablet 1     furosemide 20 MG Oral Tab Take 1 tablet (20 mg total) by mouth 3 (three) times a week.       estradiol (ESTRACE) 0.1 MG/GM Vaginal Cream Apply a small (pea-sized) amount to periurethral region daily for two weeks and three times weekly thereafter 42.5 g 5     methenamine 1 g Oral Tab Take 1 tablet (1 g total) by mouth 2 (two) times daily. (Patient taking differently: Take 1 tablet (1 g total) by mouth daily.) 180 tablet 3     loperamide 2 MG Oral Cap Take 1 capsule (2 mg total) by mouth as needed for Diarrhea.       VITAMIN D, CHOLECALCIFEROL, OR Take by mouth daily.       XARELTO 20 MG Oral Tab Take 1 tablet (20 mg total) by mouth daily with food. (Patient taking differently: Take 1 tablet (20 mg total) by mouth daily with food.) 90 tablet 3     ALBUTEROL 108 (90 Base) MCG/ACT Inhalation Aero Soln INHALE 2 PUFFS INTO THE LUNGS EVERY 4 TO 6 HOURS AS NEEDED FOR WHEEZING 6.7 each 0     metroNIDAZOLE 0.75 % External Lotion APPLY SMALL AMOUNT TO FULL FACE ONCE DAILY AFTER CLEANSING       donepezil 5 MG Oral Tab Take 1 tablet (5 mg total) by mouth nightly.       Niacin (VITAMIN B-3 OR) Take 1 tablet by mouth daily.       BREO ELLIPTA 100-25 MCG/INH Inhalation Aerosol Powder, Breath Activated TAKE 1 PUFF BY MOUTH EVERY DAY (Patient taking differently: Inhale 1 puff into the lungs daily as needed.) 3 each 0      PANTOPRAZOLE 40 MG Oral Tab EC TAKE 1 TABLET (40 MG TOTAL) BY MOUTH 2 (TWO) TIMES DAILY BEFORE MEALS. 180 tablet 1     Fluticasone Propionate 50 MCG/ACT Nasal Suspension 2 sprays by Each Nare route daily. (Patient taking differently: 2 sprays by Each Nare route daily as needed.) 3 Bottle 3     Glucos-Chond-Hyal Ac-Ca Fructo (MOVE FREE [employer] HEALTH ADVANCE) Oral Tab Take 1 tablet by mouth daily.       Metoprolol Succinate (TOPROL XL) 50 MG Oral Tablet SR 24 Hr Take 1 tablet (50 mg total) by mouth nightly.       nitrofurantoin monohydrate macro 100 MG Oral Cap Take 1 capsule (100 mg total) by mouth 2 (two) times daily for 10 days. 20 capsule 0     NON FORMULARY SM42 Cholesterol 2%/Lovastatin 2% ointment. Apply to lower legs 2 times per day          Allergies: Cefuroxime, Levofloxacin, Vancomycin, Sulfa drugs cross reactors, Bacitracin-polymyxin b, Adhesive tape, Latex, Lecithin, and Vancomycin      Anesthesia Evaluation    Patient summary reviewed.    Anesthetic Complications  (-) history of anesthetic complications         GI/Hepatic/Renal      (+) GERD       (+) chronic renal disease and CRI  (+) liver disease (cirrhosis)                 Cardiovascular    Negative cardiovascular ROS.    Exercise tolerance: good     MET: >4                                           Endo/Other      (+) diabetes  type 2,                          Pulmonary        (+) COPD            (+) sleep apnea       Neuro/Psych    Negative neuro/psych ROS.                                  Past Surgical History:   Procedure Laterality Date    Cholecystectomy  01/22/2019    Colonoscopy  2011    due in 2021    Dexa,bone density,axial skeleton  02/21/2009    Each add tooth extraction  05/16/2016    Hx breast cancer  01/06/2017    DCIS    Hysterectomy      Lumpectomy left  01/06/2017    DCIS-Lumpectomy @ [facility]    Needle biopsy left  12/08/2016    DCIS    Other surgical history      birch and sling bladder sx, liposuction and tummy tuck    Other surgical  history      esophagogastroduodenoscopy    Other surgical history  1992    nasal septal deviation repair    Other surgical history  2013    vaginal lift     Other surgical history  2013    bladder sling     Other surgical history  2023    gallstone removal    Parathyroidectomy      Sinus surgery        Total abdom hysterectomy      Vaginal hysterectomy       Social History     Socioeconomic History    Marital status:    Tobacco Use    Smoking status: Former     Current packs/day: 0.00     Average packs/day: 1 pack/day for 17.4 years (17.4 ttl pk-yrs)     Types: Cigarettes     Start date: 1966     Quit date: 2/15/1984     Years since quittin.2    Smokeless tobacco: Never    Tobacco comments:     smoked for 14years    Vaping Use    Vaping status: Never Used   Substance and Sexual Activity    Alcohol use: Yes     Comment: 2 glasses a month    Drug use: No   Other Topics Concern    Caffeine Concern Yes     Comment: 3 a week     Exercise Yes     Comment: 3 x a week      History   Drug Use No     Available pre-op labs reviewed.               Airway      Mallampati: II  Mouth opening: >3 FB  TM distance: > 6 cm  Neck ROM: full Cardiovascular    Cardiovascular exam normal.  Rhythm: regular  Rate: normal     Dental    Dentition appears grossly intact         Pulmonary    Pulmonary exam normal.  Breath sounds clear to auscultation bilaterally.               Other findings              ASA: 3   Plan: MAC  NPO status verified and patient meets guidelines.  Patient has not taken beta blockers in last 24 hours.  Post-procedure pain management plan discussed with surgeon and patient.      Plan/risks discussed with: patient and spouse                Present on Admission:  **None**

## 2024-04-30 NOTE — H&P
[facility]  Pre-procedure History and Physical      Laura Mccarty Patient Status:  Hospital Outpatient Surgery    1946 MRN [id number]   Location [facility] ENDOSCOPY PAIN CENTER Attending Federico Pina MD   Hosp Day # 0 PCP Codi Mirza MD       2024    HISTORY OF PRESENT ILLNESS    Laura Mccarty is a  77 year old female who has a history of colon polyps. Her last colonoscopy was in . Four adenomas were removed.     PAST MEDICAL HISTORY    Past Medical History:    Anesthesia complication    had hallucinations from Propofol long time ago, no issues recently    Arrhythmia    BACK PAIN    Breast cancer (HCC)    DCIS    C. difficile diarrhea    CANCER    skin cancer    Deep vein thrombosis (HCC)    right leg    Diabetes (HCC)    Disorder of liver    fatty liver, hepatic cirrhosis    Esophageal reflux    Frequent PVCs    GERD    H/O colonoscopy    H/O endoscopic sinus surgery    History of blepharoplasty    upper lid w/ excessive skin    Hypercalcemia    Malignant neoplasm of left female breast (HCC)    Obstructive apnea    Amery HST AHI 7.4 autoPAP 6-16 Prism    Osteoarthritis    Pulmonary embolism (HCC)    Recurrent pulmonary embolism (HCC)    Renal disorder    Routine Papanicolaou smear    Shortness of breath    Skin cancer    Sleep apnea    cpap    Squamous cell carcinoma in situ of skin of neck    Type 2 diabetes mellitus without complication, without long-term current use of insulin (HCC)    URINARY PROBLEMS    Visual impairment    contacts/glasses       PAST SURGICAL HISTORY    Past Surgical History:   Procedure Laterality Date    Cholecystectomy  2019    Colonoscopy      due in     Dexa,bone density,axial skeleton  2009    Each add tooth extraction  2016    Hx breast cancer  2017    DCIS    Hysterectomy      Lumpectomy left  2017    DCIS-Lumpectomy @ [facility]    Needle biopsy left  2016    DCIS    Other surgical history      birch and sling  bladder sx, liposuction and tummy tuck    Other surgical history      esophagogastroduodenoscopy    Other surgical history  01/01/1992    nasal septal deviation repair    Other surgical history  06/2013    vaginal lift     Other surgical history  07/2013    bladder sling     Other surgical history  06/23/2023    gallstone removal    Parathyroidectomy      Sinus surgery        Total abdom hysterectomy      Vaginal hysterectomy         MEDICATIONS    Current Outpatient Medications   Medication Sig Dispense Refill    fenofibrate 145 MG Oral Tab Take 1 tablet (145 mg total) by mouth daily. 90 tablet 1    furosemide 20 MG Oral Tab Take 1 tablet (20 mg total) by mouth 3 (three) times a week.      estradiol (ESTRACE) 0.1 MG/GM Vaginal Cream Apply a small (pea-sized) amount to periurethral region daily for two weeks and three times weekly thereafter 42.5 g 5    methenamine 1 g Oral Tab Take 1 tablet (1 g total) by mouth 2 (two) times daily. (Patient taking differently: Take 1 tablet (1 g total) by mouth daily.) 180 tablet 3    loperamide 2 MG Oral Cap Take 1 capsule (2 mg total) by mouth as needed for Diarrhea.      VITAMIN D, CHOLECALCIFEROL, OR Take by mouth daily.      XARELTO 20 MG Oral Tab Take 1 tablet (20 mg total) by mouth daily with food. (Patient taking differently: Take 1 tablet (20 mg total) by mouth daily with food.) 90 tablet 3    ALBUTEROL 108 (90 Base) MCG/ACT Inhalation Aero Soln INHALE 2 PUFFS INTO THE LUNGS EVERY 4 TO 6 HOURS AS NEEDED FOR WHEEZING 6.7 each 0    metroNIDAZOLE 0.75 % External Lotion APPLY SMALL AMOUNT TO FULL FACE ONCE DAILY AFTER CLEANSING      donepezil 5 MG Oral Tab Take 1 tablet (5 mg total) by mouth nightly.      Niacin (VITAMIN B-3 OR) Take 1 tablet by mouth daily.      BREO ELLIPTA 100-25 MCG/INH Inhalation Aerosol Powder, Breath Activated TAKE 1 PUFF BY MOUTH EVERY DAY (Patient taking differently: Inhale 1 puff into the lungs daily as needed.) 3 each 0    PANTOPRAZOLE 40 MG Oral  Tab EC TAKE 1 TABLET (40 MG TOTAL) BY MOUTH 2 (TWO) TIMES DAILY BEFORE MEALS. 180 tablet 1    Fluticasone Propionate 50 MCG/ACT Nasal Suspension 2 sprays by Each Nare route daily. (Patient taking differently: 2 sprays by Each Nare route daily as needed.) 3 Bottle 3    Glucos-Chond-Hyal Ac-Ca Fructo (MOVE FREE [employer] HEALTH ADVANCE) Oral Tab Take 1 tablet by mouth daily.      Metoprolol Succinate (TOPROL XL) 50 MG Oral Tablet SR 24 Hr Take 1 tablet (50 mg total) by mouth nightly.      nitrofurantoin monohydrate macro 100 MG Oral Cap Take 1 capsule (100 mg total) by mouth 2 (two) times daily for 10 days. 20 capsule 0    NON FORMULARY SM42 Cholesterol 2%/Lovastatin 2% ointment. Apply to lower legs 2 times per day         PHYSICAL EXAM    Vitals:    04/30/24 1525   BP: 134/47   Pulse: 58   Resp: 18   Temp: 98.4 °F (36.9 °C)     HEENT: normocephalic, oropharynx clear  HEART: normal S1S2  LUNGS: clear  ABDOMEN: soft, bowel sounds positive, no masses    ASSESSMENT:    History of colon polyps    PLAN:    Colonoscopy. The procedure was reviewed with the patient. The patient is aware of the risks, including bleeding, perforation and anesthetic complications. The limitations of the procedure were reviewed. The patient agrees and all questions were answered.

## 2024-04-30 NOTE — DISCHARGE INSTRUCTIONS
Home Care Instructions for Colonoscopy with Sedation    Diet:  - Resume your regular diet   - Start with light meals to minimize bloating.  - Do not drink alcohol today.    Medication:  - If you have questions about resuming your normal medications, please contact your Primary Care Physician.    Activities:  - Take it easy today. Do not return to work today.  - Do not drive today.  - Do not operate any machinery today (including kitchen equipment).    Colonoscopy:  - You may notice some rectal \"spotting\" (a little blood on the toilet tissue) for a day or two after the exam. This is normal.  - If you experience any rectal bleeding (not spotting), persistent tenderness or sharp severe abdominal pains, oral temperature over 100 degrees Fahrenheit, light-headedness or dizziness, or any other problems, contact your doctor.    **If unable to reach your doctor, please go to the [facility] Emergency Room**    - Your referring physician will receive a full report of your examination.  - If you do not hear from your doctor's office within two weeks of your biopsy, please call them for your results.    Resume your Xarelto on Thursday 5/2/24

## 2024-06-28 NOTE — TELEPHONE ENCOUNTER
LOV:  01/03/2024 for: MA- Supervisit   Patient advised to RTC on:  6 months (around 7/3/2024   Nov:07/03/2024    Medication Quantity Refills Start End   fenofibrate 145 MG Oral Tab 90 tablet 1 1/9/2024 --   Sig:   Take 1 tablet (145 mg total) by mouth daily.

## 2024-07-03 NOTE — PROGRESS NOTES
Luara Mccarty is a 77 year old female.  Diabetes, hypercholesterolemia, COPD, hyperparathyroidism, osteopenia eye problems  HPI:   Patient complains of having problem of her eyes with irritation , having increased drainage from the eyes . suspects problem with the tear duct drainage.  She saw different physicians.  Has an appointment coming up with Dr. Luis to Dr. Luis tear duct  next Tuesday.     Diabetes type 2 diet controlled patient is doing well. Continue monitoring.  Blood work ordered.      Patient was diagnosed with left breast DCIS.  She is seeing .  Doing well .     Patient was diagnosed with primary hyperparathyroidism in 2023 she had 2 of the parathyroid glands removed.  She had hyperglycemia which led to her evaluation.  Right now she is doing well.  Calcium will be rechecked.  Patient has osteopenia on a DEXA scan in the femoral neck. Recommended to talk with endocrinologist regarding treatment of the osteopenia especially but she recently had a surgery for parathyroid gland.    Patient diagnosed with COPD.  She saw Dr. Lauren.  Was using Breo inhaler.  Right now she is doing well.  Noticed however when walking up the stairs she gets more short of breath.  Recommended to try Breo  inhaler regularly and see if there is improvement.  She would like new prescription her previous prescription .    Patient has obstructive sleep apnea on CPAP machine.  Recommended follow-up with Dr. Lauren.     GERD patient is seeing Dr. Pina on pantoprazole doing well medication helps with controlling symptoms.  Have some stomach upset and bloating.  She had a surgery last year for bile duct stone.     History of multiple pulmonary embolisms.  She is on Xarelto seeing Dr. Hall.     Hyperlipidemia patient is taking fenofibrate doing well.  Blood work will be rechecked.     Patient was diagnosed with lymphedema bilateral lower legs right is getting more swollen than the left leg.   Monitor.    Patient with a history of gallstones in the biliary ducts they were removed had acute pancreatitis , stable now.  Monitored by GI.     Patient is seeing cardiologist for frequent PVCs and mild sick sinus syndrome.  She is seeing Dr. Jama and Dr. Galeana electrophysiologist.          Current Outpatient Medications   Medication Sig Dispense Refill    fenofibrate 145 MG Oral Tab Take 1 tablet (145 mg total) by mouth daily. 90 tablet 1    fluticasone furoate-vilanterol (BREO ELLIPTA) 100-25 MCG/ACT Inhalation Aerosol Powder, Breath Activated Inhale 1 puff into the lungs daily. 1 each 3    NON FORMULARY SM42 Cholesterol 2%/Lovastatin 2% ointment. Apply to lower legs 2 times per day      furosemide 20 MG Oral Tab Take 1 tablet (20 mg total) by mouth 3 (three) times a week.      estradiol (ESTRACE) 0.1 MG/GM Vaginal Cream Apply a small (pea-sized) amount to periurethral region daily for two weeks and three times weekly thereafter 42.5 g 5    methenamine 1 g Oral Tab Take 1 tablet (1 g total) by mouth 2 (two) times daily. (Patient taking differently: Take 1 tablet (1 g total) by mouth daily.) 180 tablet 3    loperamide 2 MG Oral Cap Take 1 capsule (2 mg total) by mouth as needed for Diarrhea.      VITAMIN D, CHOLECALCIFEROL, OR Take by mouth daily.      XARELTO 20 MG Oral Tab Take 1 tablet (20 mg total) by mouth daily with food. (Patient taking differently: Take 1 tablet (20 mg total) by mouth daily with food.) 90 tablet 3    ALBUTEROL 108 (90 Base) MCG/ACT Inhalation Aero Soln INHALE 2 PUFFS INTO THE LUNGS EVERY 4 TO 6 HOURS AS NEEDED FOR WHEEZING 6.7 each 0    metroNIDAZOLE 0.75 % External Lotion APPLY SMALL AMOUNT TO FULL FACE ONCE DAILY AFTER CLEANSING      donepezil 5 MG Oral Tab Take 1 tablet (5 mg total) by mouth nightly.      Niacin (VITAMIN B-3 OR) Take 1 tablet by mouth daily.      BREO ELLIPTA 100-25 MCG/INH Inhalation Aerosol Powder, Breath Activated TAKE 1 PUFF BY MOUTH EVERY DAY (Patient taking  differently: Inhale 1 puff into the lungs daily as needed.) 3 each 0    PANTOPRAZOLE 40 MG Oral Tab EC TAKE 1 TABLET (40 MG TOTAL) BY MOUTH 2 (TWO) TIMES DAILY BEFORE MEALS. 180 tablet 1    Fluticasone Propionate 50 MCG/ACT Nasal Suspension 2 sprays by Each Nare route daily. (Patient taking differently: 2 sprays by Each Nare route daily as needed.) 3 Bottle 3    Glucos-Chond-Hyal Ac-Ca Fructo (MOVE FREE [facility] ADVANCE) Oral Tab Take 1 tablet by mouth daily.      Metoprolol Succinate (TOPROL XL) 50 MG Oral Tablet SR 24 Hr Take 1 tablet (50 mg total) by mouth nightly.        Past Medical History:    Anesthesia complication    had hallucinations from Propofol long time ago, no issues recently    Arrhythmia    BACK PAIN    Breast cancer (HCC)    DCIS    C. difficile diarrhea    CANCER    skin cancer    Deep vein thrombosis (HCC)    right leg    Diabetes (HCC)    Disorder of liver    fatty liver, hepatic cirrhosis    Esophageal reflux    Frequent PVCs    GERD    H/O colonoscopy    H/O endoscopic sinus surgery    History of blepharoplasty    upper lid w/ excessive skin    Hypercalcemia    Malignant neoplasm of left female breast (HCC)    Obstructive apnea    [facility] AHI 7.4 autoPAP 6-16 Prism    Osteoarthritis    Pulmonary embolism (HCC)    Recurrent pulmonary embolism (HCC)    Renal disorder    Routine Papanicolaou smear    Shortness of breath    Skin cancer    Sleep apnea    cpap    Squamous cell carcinoma in situ of skin of neck    Type 2 diabetes mellitus without complication, without long-term current use of insulin (HCC)    URINARY PROBLEMS    Visual impairment    contacts/glasses      Social History:  Social History     Socioeconomic History    Marital status:    Tobacco Use    Smoking status: Former     Current packs/day: 0.00     Average packs/day: 1 pack/day for 17.4 years (17.4 ttl pk-yrs)     Types: Cigarettes     Start date: 1966     Quit date: 2/15/1984     Years since quittin.4     Smokeless tobacco: Never    Tobacco comments:     smoked for 14years    Vaping Use    Vaping status: Never Used   Substance and Sexual Activity    Alcohol use: Yes     Comment: 2 glasses a month    Drug use: No   Other Topics Concern    Caffeine Concern Yes     Comment: 3 a week     Exercise Yes     Comment: 3 x a week      Social Determinants of Health     Financial Resource Strain: Low Risk  (6/20/2023)    Financial Resource Strain     Difficulty of Paying Living Expenses: Not very hard     Med Affordability: No   Transportation Needs: No Transportation Needs (6/20/2023)    Transportation Needs     Lack of Transportation: No   Physical Activity: Sufficiently Active (10/9/2023)    [facility] [employer] [employer]    Exercise Vital Sign     On average, how many days per week do you engage in moderate to strenuous exercise (like a brisk walk)?: 4 days     On average, how many minutes do you engage in exercise at this level?: 60 min        REVIEW OF SYSTEMS:   GENERAL HEALTH: feels well otherwise  SKIN: denies any unusual skin lesions or rashes  HEENT no congestion no runny nose no sore throat  EYES-eye tearing  Neck no neck pain   RESPIRATORY: denies shortness of breath with exertion  CARDIOVASCULAR: denies chest pain on exertion  GI: denies abdominal pain and denies heartburn  NEURO: denies headaches  Psych normal mood.    EXAM:   /60 (BP Location: Left arm, Patient Position: Sitting, Cuff Size: adult)   Pulse 64   Temp 98.2 °F (36.8 °C) (Temporal)   Resp 18   Ht 5' 6\" (1.676 m)   Wt 156 lb (70.8 kg)   LMP 09/29/1996   BMI 25.18 kg/m²   GENERAL: well developed, well nourished,in no apparent distress  SKIN: no rashes,no suspicious lesions  Eyes excessive tearing with irritation of the eyes  HEENT: atraumatic, normocephalic,ears and throat are clear  NECK: supple,no adenopathy,  LUNGS: clear to auscultation  CARDIO: RRR without murmur  GI: good BS's,no  masses, HSM or tenderness  EXTREMITIES: no cyanosis, clubbing or edema  Psychiatric - alert  and oriented x3, normal mood      Narrative   PROCEDURE:  XR DEXA BONE DENSITOMETRY (CPT=77080)     COMPARISON:  [facility], XR DEXA BONE DENSITOMETRY (CPT=77080), 8/07/2019, 9:07 AM.     INDICATIONS:  Z78.0 Postmenopausal     PATIENT STATED HISTORY: (As transcribed by Technologist)  Osteoporosis.          LUMBAR SPINE ANALYSIS RESULTS:      Bone mineral density (BMD) (g/cm2):  0.887    Lumbar T-Score:  -1.5      % young normals:  85      % age matched controls:  115      Change from prior spine examination:  -6.3*%              TOTAL HIP ANALYSIS RESULTS:        Bone mineral density (BMD) (g/cm2):  0.773      Total Hip T-Score:  -1.4      % young normals:  82      % age matched controls:  109      Change from prior hip examination:  -4.0*%              FEMORAL NECK ANALYSIS RESULTS:        Bone mineral density (BMD) (g/cm2):  0.627      Femoral neck T-Score:  -2.0      % young normals:  74      % age matched controls:  103      Change from prior hip examination:  -10.9*%         10 Year Fracture Risk:  Major osteoporotic fracture:  25.0 %     Hip fracture:  15.0 %     ADDITIONAL FINDINGS:  No significant additional findings.                     Impression   CONCLUSION:  Osteopenia throughout     The World Health Organization has defined the following categories based on bone density:  Normal bone:  T-score better than -1  Osteopenia: T-score between -1 and -2.5  Osteoporosis:  T-score less than -2.5        LOCATION:  [id number]              Dictated by (CST): Jv Dubois MD on 1/15/2024 at 3:40 PM      Finalized by (CST): Jv Dubois MD on 1/15/2024 at 3:40 PM       ASSESSMENT AND PLAN:     Encounter Diagnoses   Name Primary?    Type 2 diabetes mellitus without complication, without long-term current use of insulin (HCC) Yes    Hypertriglyceridemia     Hyperparathyroidism (HCC)     Osteopenia of neck of femur,  unspecified laterality     COPD, mild (HCC)     Gastroesophageal reflux disease without esophagitis        Orders Placed This Encounter   Procedures    Comp Metabolic Panel (14)    Lipid Panel    Hemoglobin A1C    Microalb/Creat Ratio, Random Urine       Meds & Refills for this Visit:  Requested Prescriptions     Signed Prescriptions Disp Refills    fenofibrate 145 MG Oral Tab 90 tablet 1     Sig: Take 1 tablet (145 mg total) by mouth daily.    fluticasone furoate-vilanterol (BREO ELLIPTA) 100-25 MCG/ACT Inhalation Aerosol Powder, Breath Activated 1 each 3     Sig: Inhale 1 puff into the lungs daily.     Continue current meds.   Watch diet for fats and carbs.   Stay active.    Do fasting blood work.  Discuss with endocrinologist your bone density scan results.  Schedule Medicare super visit for January 2024.  Will call you with blood test results when those are back.    Imaging & Consults:  None    The patient indicates understanding of these issues and agrees to the plan.  The patient is asked to return in jAN 2025 for super visit.  The note was dictated using speech recognition software.  Accuracy and grammar in transcription may be subject to error.

## 2024-07-03 NOTE — PATIENT INSTRUCTIONS
Continue current meds.   Watch diet for fats and carbs.   Stay active.    Do fasting blood work.  Discuss with endocrinologist your bone density scan results.  Schedule Medicare super visit for January 2024.  Will call you with blood test results when those are back.

## 2024-09-27 NOTE — TELEPHONE ENCOUNTER
Patient is experiencing severe leg cramps on B legs and feet. 98% percent in the night.   Tingling and cramps on and off in both hands, on and off.   This has been going on for a couple of months, with no improvement.   Has not taken anything over the counter.     Please advise, patient doesn't know if she should come in for a appointment.        (2) good, crying

## 2024-09-27 NOTE — TELEPHONE ENCOUNTER
Please give patient a call and ask if there is any bruising or throbbing pain.  Where suspicion for clot should be going to urgent care for evaluation today.  If there is no suspicion of blood clot I can see her Monday 2 PM or Tuesday 10 AM.  Thank you

## 2024-09-27 NOTE — TELEPHONE ENCOUNTER
Patient called back. She has no bruising. The pain is not throbbing. It is a cramping pain that happens only at night. She has had the pain in both legs at night. Patient would like to see Dr. Mirza on Monday at 2:00 pm. Patient was instructed if she develops any bruising or throbbing pain in her legs to go to the immediate care over the weekend. Pt. Agreed to plan and verbalized understanding

## 2024-09-30 NOTE — PROGRESS NOTES
Laura Mccarty is a 78 year old female.  Cramps bilateral lower legs, left leg pain anterior aspect, tingling bilateral lower extremities and hands  HPI:   Patient is coming to the office complaining of having pain bilateral lower legs and feet.  It is going on for couple of months.  Feels like there are cramps in the legs.  The symptoms are happening on and off.  Said that she gets it during the day but mostly at night wakes her up.  The pain would be severe in the legs.  It seems to be worsening over time.  There is no swelling of the legs.  She was trying to drink some Gatorade with minimal improvement.  Has some low back pain but does not seem to be worse.  It is posterior back at the waistline it is going on for a while.  Patient complains of having tingling bilateral lower extremities and tingling in the fingers bilateral hands.  Will check blood work.    Patient complains of having area of tenderness at the anterior aspect of the left leg.  She notices when she is getting on the bed she is kneeling on that leg and it is tender and painful.  There is no palpable nodules.  Will proceed with ultrasound for evaluation.    Patient is osteopenia on the DEXA scan.  There is osteopenia in the lumbar spine femoral neck and hip.  Patient has questions regarding treatment.  She is taking vitamin D3 4000 units daily does not take calcium right now.  She had hyperparathyroidism and couple of the parathyroid glands were removed.  Will obtain blood work and make further recommendations.    Current Outpatient Medications   Medication Sig Dispense Refill    erythromycin 5 MG/GM Ophthalmic Ointment APPLY INTO BOTH EYES 3 TIMES A DAY      Doxycycline Monohydrate 50 MG Oral Cap Take by mouth 2 (two) times daily.      ipratropium 0.03 % Nasal Solution 2 sprays by Nasal route 3 (three) times daily as needed.      donepezil 10 MG Oral Tab Take 1 tablet (10 mg total) by mouth nightly.      fenofibrate 145 MG Oral Tab Take 1 tablet (145  mg total) by mouth daily. 90 tablet 1    fluticasone furoate-vilanterol (BREO ELLIPTA) 100-25 MCG/ACT Inhalation Aerosol Powder, Breath Activated Inhale 1 puff into the lungs daily. 1 each 3    furosemide 20 MG Oral Tab Take 1 tablet (20 mg total) by mouth 3 (three) times a week.      estradiol (ESTRACE) 0.1 MG/GM Vaginal Cream Apply a small (pea-sized) amount to periurethral region daily for two weeks and three times weekly thereafter 42.5 g 5    methenamine 1 g Oral Tab Take 1 tablet (1 g total) by mouth 2 (two) times daily. (Patient taking differently: Take 1 tablet (1 g total) by mouth daily.) 180 tablet 3    loperamide 2 MG Oral Cap Take 1 capsule (2 mg total) by mouth as needed for Diarrhea.      VITAMIN D, CHOLECALCIFEROL, OR Take by mouth daily.      XARELTO 20 MG Oral Tab Take 1 tablet (20 mg total) by mouth daily with food. (Patient taking differently: Take 1 tablet (20 mg total) by mouth daily with food.) 90 tablet 3    ALBUTEROL 108 (90 Base) MCG/ACT Inhalation Aero Soln INHALE 2 PUFFS INTO THE LUNGS EVERY 4 TO 6 HOURS AS NEEDED FOR WHEEZING 6.7 each 0    metroNIDAZOLE 0.75 % External Lotion APPLY SMALL AMOUNT TO FULL FACE ONCE DAILY AFTER CLEANSING      Niacin (VITAMIN B-3 OR) Take 1 tablet by mouth daily.      BREO ELLIPTA 100-25 MCG/INH Inhalation Aerosol Powder, Breath Activated TAKE 1 PUFF BY MOUTH EVERY DAY (Patient taking differently: Inhale 1 puff into the lungs daily as needed.) 3 each 0    PANTOPRAZOLE 40 MG Oral Tab EC TAKE 1 TABLET (40 MG TOTAL) BY MOUTH 2 (TWO) TIMES DAILY BEFORE MEALS. 180 tablet 1    Fluticasone Propionate 50 MCG/ACT Nasal Suspension 2 sprays by Each Nare route daily. (Patient taking differently: 2 sprays by Each Nare route daily as needed.) 3 Bottle 3    Glucos-Chond-Hyal Ac-Ca Fructo (MOVE FREE [facility] ADVANCE) Oral Tab Take 1 tablet by mouth daily.      Metoprolol Succinate (TOPROL XL) 50 MG Oral Tablet SR 24 Hr Take 1 tablet (50 mg total) by mouth nightly.         Past Medical History:    Anesthesia complication    had hallucinations from Propofol long time ago, no issues recently    Arrhythmia    BACK PAIN    Breast cancer (HCC)    DCIS    C. difficile diarrhea    CANCER    skin cancer    Deep vein thrombosis (HCC)    right leg    Diabetes (HCC)    Disorder of liver    fatty liver, hepatic cirrhosis    Esophageal reflux    Frequent PVCs    GERD    H/O colonoscopy    H/O endoscopic sinus surgery    History of blepharoplasty    upper lid w/ excessive skin    Hypercalcemia    Malignant neoplasm of left female breast (HCC)    Obstructive apnea    [facility] AHI 7.4 autoPAP 6-16 Prism    Osteoarthritis    Pulmonary embolism (HCC)    Recurrent pulmonary embolism (HCC)    Renal disorder    Routine Papanicolaou smear    Shortness of breath    Skin cancer    Sleep apnea    cpap    Squamous cell carcinoma in situ of skin of neck    Type 2 diabetes mellitus without complication, without long-term current use of insulin (HCC)    URINARY PROBLEMS    Visual impairment    contacts/glasses      Social History:  Social History     Socioeconomic History    Marital status:    Tobacco Use    Smoking status: Former     Current packs/day: 0.00     Average packs/day: 1 pack/day for 17.4 years (17.4 ttl pk-yrs)     Types: Cigarettes     Start date: 1966     Quit date: 2/15/1984     Years since quittin.6    Smokeless tobacco: Never    Tobacco comments:     smoked for 14years    Vaping Use    Vaping status: Never Used   Substance and Sexual Activity    Alcohol use: Yes     Comment: 2 glasses a month    Drug use: No   Other Topics Concern    Caffeine Concern Yes     Comment: 3 a week     Exercise Yes     Comment: 3 x a week      Social Determinants of Health     Financial Resource Strain: Low Risk  (2023)    Financial Resource Strain     Difficulty of Paying Living Expenses: Not very hard     Med Affordability: No   Transportation Needs: No Transportation Needs (2023)     Transportation Needs     Lack of Transportation: No   Physical Activity: Sufficiently Active (10/9/2023)    [facility]    Exercise Vital Sign     On average, how many days per week do you engage in moderate to strenuous exercise (like a brisk walk)?: 4 days     On average, how many minutes do you engage in exercise at this level?: 60 min        REVIEW OF SYSTEMS:   GENERAL HEALTH: feels well otherwise  SKIN: denies any unusual skin lesions or rashes  HEENT no congestion no runny nose no sore throat  Neck no neck pain   RESPIRATORY: denies shortness of breath with exertion  CARDIOVASCULAR: denies chest pain on exertion  GI: denies abdominal pain and denies heartburn  NEURO: denies headaches tingling fingers in bilateral lower legs  Psych normal mood  musculoskeletal bilateral leg pain      EXAM:   /64 (BP Location: Left arm, Patient Position: Sitting, Cuff Size: adult)   Pulse 64   Temp 96.8 °F (36 °C) (Temporal)   Resp 14   Ht 5' 6\" (1.676 m)   Wt 152 lb (68.9 kg)   LMP 09/29/1996   BMI 24.53 kg/m²   GENERAL: well developed, well nourished,in no apparent distress  SKIN: no rashes,no suspicious lesions  HEENT: atraumatic, normocephalic,ears and throat are clear  NECK: supple,no adenopathy  LUNGS: clear to auscultation  CARDIO: RRR without murmur  GI: good BS's,no masses, HSM or tenderness  EXTREMITIES: no edema  Skeletal there is area tenderness anterior aspect of the left leg.  Tender to palpation provoked by kneeling patient was kneeling on the chair in the office prior to the symptoms.  No palpable masses in the area  Neurologic symmetric sensation bilateral upper lower extremities  Psychiatric - alert  and oriented x3, normal mood    PROCEDURE:  XR DEXA BONE DENSITOMETRY (CPT=77080)     COMPARISON:  [facility], XR DEXA BONE DENSITOMETRY (CPT=77080), 8/07/2019, 9:07 AM.     INDICATIONS:  Z78.0 Postmenopausal     PATIENT STATED  HISTORY: (As transcribed by Technologist)  Osteoporosis.          LUMBAR SPINE ANALYSIS RESULTS:      Bone mineral density (BMD) (g/cm2):  0.887    Lumbar T-Score:  -1.5      % young normals:  85      % age matched controls:  115      Change from prior spine examination:  -6.3*%              TOTAL HIP ANALYSIS RESULTS:        Bone mineral density (BMD) (g/cm2):  0.773      Total Hip T-Score:  -1.4      % young normals:  82      % age matched controls:  109      Change from prior hip examination:  -4.0*%              FEMORAL NECK ANALYSIS RESULTS:        Bone mineral density (BMD) (g/cm2):  0.627      Femoral neck T-Score:  -2.0      % young normals:  74      % age matched controls:  103      Change from prior hip examination:  -10.9*%         10 Year Fracture Risk:  Major osteoporotic fracture:  25.0 %     Hip fracture:  15.0 %     ADDITIONAL FINDINGS:  No significant additional findings.                     Impression   CONCLUSION:  Osteopenia throughout     The World Health Organization has defined the following categories based on bone density:  Normal bone:  T-score better than -1  Osteopenia: T-score between -1 and -2.5  Osteoporosis:  T-score less than -2.5        LOCATION:  [id number]              Dictated by (CST): Jv Dubois MD on 1/15/2024 at 3:40 PM      Finalized by (CST): Jv Dubois MD on 1/15/2024 at 3:40 PM       Result History    ASSESSMENT AND PLAN:     Encounter Diagnoses   Name Primary?    Pain of left lower extremity Yes    Bilateral leg cramps     Osteopenia of neck of femur, unspecified laterality     Numbness and tingling of both legs     Osteopenia of hip, unspecified laterality     Osteopenia of lumbar spine        Orders Placed This Encounter   Procedures    Comp Metabolic Panel (14)    CBC With Differential With Platelet    Vitamin B12    Magnesium [E]    Vitamin D [E]    Folic Acid Serum [E]       Meds & Refills for this Visit:  Requested Prescriptions      No prescriptions  requested or ordered in this encounter     Do labs today.   Do x-ray of your leg.  Call [phone] to schedule ultrasound of the left leg.  Call with update on your cramps after taking diuretic on Wednesday.    Will start patient on calcium supplement after results are back.    Imaging & Consults:  US LEG LEFT LIMITED (CPT=76882)    X-ray reviewed patient notified.  PROCEDURE:  XR TIBIA + FIBULA (2 VIEWS), LEFT (CPT=73590)     TECHNIQUE:  AP and lateral views of the tibia and fibula were obtained.     COMPARISON:  None.     INDICATIONS:  M79.605 Pain of left lower extremity     PATIENT STATED HISTORY: (As transcribed by Technologist)  Patient states she has sharp left anterior lower leg pain when bending her left knee for 5-6 days. Denies any injury.         FINDINGS:    No fracture, dislocation or osseous abnormality.  Soft tissue calcifications between mid tibia and fibula likely within interosseous ligament.                   Impression   CONCLUSION:  No abnormality demonstrated in the left tibia and fibula.        LOCATION:  [facility]        Dictated by (CST): Zach Og MD on 9/30/2024 at 5:02 PM      Finalized by (CST): Zach Og MD on 9/30/2024 at 5:04 PM       The patient indicates understanding of these issues and agrees to the plan.  The patient is asked to return in 4-6 weeks.   The note was dictated using speech recognition software.  Accuracy and grammar in transcription may be subject to error.    none

## 2024-09-30 NOTE — PATIENT INSTRUCTIONS
Do labs today.   Do x-ray of your leg.  Call [phone] to schedule ultrasound of the left leg.  Call with update on your cramps after taking diuretic on Wednesday.

## 2024-10-23 NOTE — PROGRESS NOTES
Subjective:   78 year old female with hx of type II DM diet controlled, pulmonary HTN, HL, COPD, PE on Xarelto, post ERCP pancreatitis, BERNIE, hyperparathyroidism, breast CA, pelvic organ prolapse s/p SCPXY, TVT 2013; Dr. Quiros, who presents today for follow up Rashi.    Last seen in April 2024 and overall doing well but had noticed some intermittent dysuria and urinary frequency that she would occasionally self treat with left over antibiotic. We discussed and it was advised that she continue methenamine 1 gram daily with increase to BID dosage if any UTI symptoms present with submission of Ucx. It was also recommended that she continue estrace cream.     Since last visit she has not had any positive urine cultures.   UA today trace blood.  Scr 0.88    Fluids at minimum 24 oz of water, most days more than that.   Daytime frequency q 3-4     History/Other:    No Further Nursing Notes to Review         Current Outpatient Medications   Medication Sig Dispense Refill    magnesium oxide 400 MG Oral Tab Take 1 tablet (400 mg total) by mouth daily. 30 tablet 1    erythromycin 5 MG/GM Ophthalmic Ointment APPLY INTO BOTH EYES 3 TIMES A DAY      Doxycycline Monohydrate 50 MG Oral Cap Take by mouth 2 (two) times daily.      ipratropium 0.03 % Nasal Solution 2 sprays by Nasal route 3 (three) times daily as needed.      donepezil 10 MG Oral Tab Take 1 tablet (10 mg total) by mouth nightly.      fenofibrate 145 MG Oral Tab Take 1 tablet (145 mg total) by mouth daily. 90 tablet 1    fluticasone furoate-vilanterol (BREO ELLIPTA) 100-25 MCG/ACT Inhalation Aerosol Powder, Breath Activated Inhale 1 puff into the lungs daily. 1 each 3    furosemide 20 MG Oral Tab Take 1 tablet (20 mg total) by mouth 3 (three) times a week.      estradiol (ESTRACE) 0.1 MG/GM Vaginal Cream Apply a small (pea-sized) amount to periurethral region daily for two weeks and three times weekly thereafter 42.5 g 5    methenamine 1 g Oral Tab Take 1 tablet (1 g  total) by mouth 2 (two) times daily. (Patient taking differently: Take 1 tablet (1 g total) by mouth daily.) 180 tablet 3    loperamide 2 MG Oral Cap Take 1 capsule (2 mg total) by mouth as needed for Diarrhea.      VITAMIN D, CHOLECALCIFEROL, OR Take by mouth daily.      XARELTO 20 MG Oral Tab Take 1 tablet (20 mg total) by mouth daily with food. (Patient taking differently: Take 1 tablet (20 mg total) by mouth daily with food.) 90 tablet 3    ALBUTEROL 108 (90 Base) MCG/ACT Inhalation Aero Soln INHALE 2 PUFFS INTO THE LUNGS EVERY 4 TO 6 HOURS AS NEEDED FOR WHEEZING 6.7 each 0    metroNIDAZOLE 0.75 % External Lotion APPLY SMALL AMOUNT TO FULL FACE ONCE DAILY AFTER CLEANSING      Niacin (VITAMIN B-3 OR) Take 1 tablet by mouth daily.      BREO ELLIPTA 100-25 MCG/INH Inhalation Aerosol Powder, Breath Activated TAKE 1 PUFF BY MOUTH EVERY DAY (Patient taking differently: Inhale 1 puff into the lungs daily as needed.) 3 each 0    PANTOPRAZOLE 40 MG Oral Tab EC TAKE 1 TABLET (40 MG TOTAL) BY MOUTH 2 (TWO) TIMES DAILY BEFORE MEALS. 180 tablet 1    Fluticasone Propionate 50 MCG/ACT Nasal Suspension 2 sprays by Each Nare route daily. (Patient taking differently: 2 sprays by Each Nare route daily as needed.) 3 Bottle 3    Glucos-Chond-Hyal Ac-Ca Fructo (MOVE FREE [employer] HEALTH ADVANCE) Oral Tab Take 1 tablet by mouth daily.      Metoprolol Succinate (TOPROL XL) 50 MG Oral Tablet SR 24 Hr Take 1 tablet (50 mg total) by mouth nightly.         Review of Systems:  Pertinent items are noted in HPI.      Objective:   LMP 09/29/1996  Estimated body mass index is 24.53 kg/m² as calculated from the following:    Height as of 9/30/24: 5' 6\" (1.676 m).    Weight as of 9/30/24: 152 lb (68.9 kg).    No distress  Non labored respirations    Laboratory Data:  Lab Results   Component Value Date    WBC 8.7 09/30/2024    HGB 12.6 09/30/2024    .0 09/30/2024     Lab Results   Component Value Date     09/30/2024    K 4.2 09/30/2024      09/30/2024    CO2 26.0 09/30/2024    BUN 19 09/30/2024    GLU 89 09/30/2024    GFRAA 78 04/25/2022    AST 28 09/30/2024    ALT 16 09/30/2024    TP 7.4 09/30/2024    ALB 4.3 09/30/2024    PHOS 1.5 (L) 06/18/2023    CA 9.7 09/30/2024    MG 1.8 09/30/2024       Urinalysis Results (last three years):  Recent Labs     02/21/22  1625 02/21/22  1639 06/21/23  1124 06/23/23  0936 07/04/23  1423 09/01/23  1621 09/15/23  1442 10/10/23  1137 10/23/23  1323 10/23/23  1454 01/15/24  1153 04/23/24  1037 04/23/24  1143   COLORUR  --  Yellow Yellow Straw Yellow  --   --   --   --   --  Light-Yellow  --   --    CLARITY  --  Clear Clear Clear Clear  --   --   --   --   --  Clear  --   --    SPECGRAVITY 1.025 1.016 1.023 1.004 1.013 1.015 1.020 1.020 1.025  --  1.018 1.020  --    PHURINE 6.5 6.0 6.0 8.0 8.0 6.0 5.5 6.0 5.5  --  5.5 7.5  --    PROUR  --  Negative Negative Negative Negative  --   --   --   --   --  Negative  --   --    GLUUR  --  Negative Negative Negative Negative  --   --   --   --   --  Normal  --   --    KETUR  --  Negative Trace* Negative Negative  --   --   --   --   --  Negative  --   --    BILUR  --  Negative Negative Negative Negative  --   --   --   --   --  Negative  --   --    BLOODURINE  --  Small* Moderate* Small* Negative  --   --   --   --   --  Trace*  --   --    NITRITE Negative Negative Negative Negative Negative Negative Negative Negative Negative  --  Negative Negative  --    UROBILINOGEN  --  <2.0 2.0* <2.0 <2.0  --   --   --   --   --  Normal  --   --    LEUUR  --  Small* Negative Negative Negative  --   --   --   --   --  Negative  --   --    WBCUR  --  21-50* 1-5 1-5  --   --   --   --   --  1-5 1-5  --  1-5   RBCUR  --  6-10* 6-10* 0-2  --   --   --   --   --  None Seen 0-2  --  0-2   BACUR  --  None Seen None Seen Rare*  --   --   --   --   --  Rare* None Seen  --  Rare*       Urine Culture Results (last three years):  Lab Results   Component Value Date    URINECUL >100,000 CFU/ML  Escherichia coli (A) 04/23/2024    URINECUL No Growth 2 Days 12/22/2023    URINECUL >100,000 CFU/ML Escherichia coli (A) 10/10/2023    URINECUL >100,000 CFU/ML Proteus mirabilis (A) 09/15/2023    URINECUL 10,000 - 50,000 CFU/ML Escherichia coli (A) 09/01/2023    URINECUL 10,000 - 50,000 CFU/ML Escherichia coli (A) 02/21/2022    URINECUL No Growth at 18-24 hrs. 12/23/2020    URINECUL No Growth at 18-24 hrs. 11/26/2019    URINECUL >100,000 CFU/ML Escherichia coli (A) 09/23/2019    URINECUL >100,000 CFU/ML Klebsiella pneumoniae (A) 06/01/2018    URINECUL >100,000 CFU/ML Klebsiella pneumoniae (A) 04/11/2018    URINECUL No Growth 1 Day 02/13/2017    URINECUL 50,000 CFU/ML Escherichia coli (A) 02/02/2017    URINECUL >100,000 CFU/ML Klebsiella pneumoniae (A) 05/28/2016    URINECUL 50,000 CFU/ML Escherichia coli  ESBL Pos (A) 02/29/2016       Imaging  XR TIBIA + FIBULA (2 VIEWS), LEFT (CPT=73590)    Result Date: 9/30/2024  PROCEDURE:  XR TIBIA + FIBULA (2 VIEWS), LEFT (CPT=73590)  TECHNIQUE:  AP and lateral views of the tibia and fibula were obtained.  COMPARISON:  None.  INDICATIONS:  M79.605 Pain of left lower extremity  PATIENT STATED HISTORY: (As transcribed by Technologist)  Patient states she has sharp left anterior lower leg pain when bending her left knee for 5-6 days. Denies any injury.    FINDINGS:  No fracture, dislocation or osseous abnormality.  Soft tissue calcifications between mid tibia and fibula likely within interosseous ligament.            CONCLUSION:  No abnormality demonstrated in the left tibia and fibula.   LOCATION:  [facility]   Dictated by (CST): Zach Og MD on 9/30/2024 at 5:02 PM     Finalized by (CST): Zach Og MD on 9/30/2024 at 5:04 PM         Assessment & Plan:   Recurrent UTI  Vaginal atrophy    Continue with regimen Methenamine 1 gram daily and estrace cream three times per week. Standing urine culture placed.  Will send urine for microscopic urinalysis today.     Ronel ZAMUDIO  ELPIDIO Gottlieb, 10/23/2024

## 2024-10-30 NOTE — TELEPHONE ENCOUNTER
Informed patient that there are orders for her to repeat magnesium and Vitamin B12 levels 5-6 weeks after her last blood work.    Patient has 2-3 tablets of magnesium left. She will take them and have the blood work done. Patient stating she feels much better, having less muscle pain.     Patient agrees to plan and verbalized an understanding. She will have the blood work done.

## 2024-10-30 NOTE — TELEPHONE ENCOUNTER
Patient states she is feeling a lot better after taking Magnesium. Patient would like to know if she should continue taking medications. Please advise.

## 2024-11-01 NOTE — TELEPHONE ENCOUNTER
Patient called back and she wanted to confirm if she needs to get a refill on her magnesium and when she needed to recheck her labs.    Informed patient that Dr. Mirza has sent another refill on her magnesium and she is to continue and future order for magnesium and B 12 is to be done on 11/5/2024 as ordered.    Patient voiced understanding and agreed with plan.

## 2024-11-18 NOTE — TELEPHONE ENCOUNTER
Patient wants to know when to do her Magnesium repeat blood work. Informed patient 12/6 onwards.   Patient verbalized understanding.

## 2024-12-02 NOTE — TELEPHONE ENCOUNTER
LOV:7/3/2024   for: Medication Follow-Up   Patient advised to RTC on:  January 2024.   Nov;01/13/2025     Medication Quantity Refills Start End   fenofibrate 145 MG Oral Tab 90 tablet 1 7/3/2024 --   Sig:   Take 1 tablet (145 mg total) by mouth daily.

## 2025-01-02 NOTE — TELEPHONE ENCOUNTER
Hold xarelto x 48 hours prior to ERCP, resume the following day or when cleared by GI 

## 2025-01-13 NOTE — PROGRESS NOTES
Subjective:   Laura Mccarty is a 78 year old female who presents for a MA (Medicare Advantage) Supervisit (Once per calendar year) and also go over chronic medical problems leg cramps. esophageal reflux, chronic kidney disease, leg cramps, lymphedema, frequent PVCs, recurrent UTI history of gallstones, history of DCIS.    Patient was complaining leg cramps during last visit we started patient on magnesium tablets she takes once a day  1 tablet.  Doing much better.  Recheck magnesium level of the levels are okay we may increase it to 2 tablets a day.  She says that 2 tablets worked better for her.    Diabetes type 2 diet controlled patient is doing well. Continue monitoring.  Blood work ordered.  Foot examination done today.    Patient was diagnosed with left breast DCIS.  She is seeing .  Doing well mammogram will be due beginning of 2024.    Patient was diagnosed with primary hyperparathyroidism in March 2023 she had 2 of the parathyroid glands removed.  She had hyperglycemia which led to her evaluation.  Right now she is doing well.  She would like calcium to be checked order was placed for the blood work.    Patient diagnosed with COPD.  She saw Dr. Lauren.  Was using Breo inhaler.  Right now she is doing well.     Patient has obstructive sleep apnea on CPAP machine.  Recommended follow-up with Dr. Lauren.    GERD patient is seeing Dr. Pina on pantoprazole doing well medication helps with controlling symptoms.  Complains of having loose stools after gallbladder surgery.  She will see GI doctor for evaluation.  She is using Imodium as needed.    History of multiple pulmonary embolisms.  She is on Xarelto seeing Dr. Hall.    Hyperlipidemia patient is taking fenofibrate doing well.  Blood work will be rechecked.    Patient was diagnosed with lymphedema bilateral lower legs right is getting more swollen than the left leg.  The swelling comes on and off.  Right now she is doing well.  Not  consistently wearing compressive stockings.      Patient with a history of gallstones in the biliary ducts they were removed had acute pancreatitis right now doing well.  Monitored by GI.    Patient is seeing cardiologist for frequent PVCs and mild sick sinus syndrome.  She is seeing Dr. Jama and Dr. Galeana electrophysiologist.    Diarrhes 3-4 BM in am, then more liquid.  Monitor follow-up with GI doctor.  History/Other:   Fall Risk Assessment:   She has been screened for Falls and is low risk.      Cognitive Assessment:   She had a completely normal cognitive assessment - see flowsheet entries     Functional Ability/Status:   Laura Mccarty has some abnormal functions as listed below:  She has problems with Memory based on screening of functional status.       Depression Screening (PHQ-2/PHQ-9): PHQ-2 SCORE: 0  , done 1/13/2025       Advanced Directives:   She has a Living Will on file in [place]; reviewed and discussed documents with patient (and family/surrogate if present).  She has a Power of  for Health Care on file in [place].  Discussed Advance Care Planning with patient (and family/surrogate if present). Standard forms made available to patient in After Visit Summary.      Patient Active Problem List   Diagnosis    Esophageal reflux    Pure hyperglyceridemia    Fear of flying    Frequent PVCs    Personal history of pulmonary embolism    COPD, mild (HCC)    BERNIE (obstructive sleep apnea)    Overweight    Mixed incontinence    Female genital prolapse    Personal history of other malignant neoplasm of skin    Recurrent pulmonary embolism (HCC)    Pulmonary hypertension (HCC)    Abdominal pain    Hepatic cirrhosis, unspecified hepatic cirrhosis type, unspecified whether ascites present (HCC)    Hyperparathyroidism (HCC)    Type 2 diabetes mellitus without complication, without long-term current use of insulin (HCC)     Allergies:  She is allergic to cefuroxime, levofloxacin, vancomycin, sulfa drugs cross  reactors, bacitracin-polymyxin b, adhesive tape, latex, lecithin, and vancomycin.    Current Medications:  Outpatient Medications Marked as Taking for the 1/13/25 encounter (Office Visit) with Codi Mirza MD   Medication Sig    fenofibrate 145 MG Oral Tab Take 1 tablet (145 mg total) by mouth daily.    magnesium oxide 400 MG Oral Tab Take 1 tablet (400 mg total) by mouth 2 (two) times daily.    cycloSPORINE 0.05 % Ophthalmic Emulsion Apply 1 drop to eye 2 (two) times daily.    estradiol (ESTRACE) 0.1 MG/GM Vaginal Cream Apply a small (pea-sized) amount to periurethral region three times weekly    methenamine 1 g Oral Tab Take 1 tablet (1 g total) by mouth 2 (two) times daily.    erythromycin 5 MG/GM Ophthalmic Ointment APPLY INTO BOTH EYES 3 TIMES A DAY    Doxycycline Monohydrate 50 MG Oral Cap Take by mouth 2 (two) times daily.    ipratropium 0.03 % Nasal Solution 2 sprays by Nasal route 3 (three) times daily as needed.    donepezil 10 MG Oral Tab Take 1 tablet (10 mg total) by mouth nightly.    furosemide 20 MG Oral Tab Take 1 tablet (20 mg total) by mouth 3 (three) times a week.    loperamide 2 MG Oral Cap Take 1 capsule (2 mg total) by mouth as needed for Diarrhea.    VITAMIN D, CHOLECALCIFEROL, OR Take by mouth daily.    XARELTO 20 MG Oral Tab Take 1 tablet (20 mg total) by mouth daily with food.    ALBUTEROL 108 (90 Base) MCG/ACT Inhalation Aero Soln INHALE 2 PUFFS INTO THE LUNGS EVERY 4 TO 6 HOURS AS NEEDED FOR WHEEZING    metroNIDAZOLE 0.75 % External Lotion APPLY SMALL AMOUNT TO FULL FACE ONCE DAILY AFTER CLEANSING    Niacin (VITAMIN B-3 OR) Take 1 tablet by mouth daily.    BREO ELLIPTA 100-25 MCG/INH Inhalation Aerosol Powder, Breath Activated TAKE 1 PUFF BY MOUTH EVERY DAY    PANTOPRAZOLE 40 MG Oral Tab EC TAKE 1 TABLET (40 MG TOTAL) BY MOUTH 2 (TWO) TIMES DAILY BEFORE MEALS.    Fluticasone Propionate 50 MCG/ACT Nasal Suspension 2 sprays by Each Nare route daily.    Glucos-Chond-Hyal Ac-Ca Fructo  ([facility]) Oral Tab Take 1 tablet by mouth daily.    Metoprolol Succinate (TOPROL XL) 50 MG Oral Tablet SR 24 Hr Take 1 tablet (50 mg total) by mouth nightly.       Medical History:  She  has a past medical history of Anesthesia complication, Arrhythmia, BACK PAIN, Breast cancer ([facility]) (01/09/2017), C. difficile diarrhea (06/01/2015), CANCER, CKD (chronic kidney disease) stage 3, GFR 30-59 ml/min ([facility]) (09/15/2023), Deep vein thrombosis ([facility]), Diabetes ([facility]), Disorder of liver, Esophageal reflux (06/29/2012), Frequent PVCs, GERD, H/O colonoscopy (01/01/2010), H/O endoscopic sinus surgery (09/21/2022), History of blepharoplasty, Hypercalcemia (11/23/2016), Malignant neoplasm of left female breast ([facility]) (01/10/2019), Obstructive apnea (08/02/2017), Osteoarthritis, Post-ERCP acute pancreatitis ([facility]) (06/16/2023), Pulmonary embolism ([facility]), Recurrent pulmonary embolism ([facility]) (02/23/2018), Renal disorder, Routine Papanicolaou smear (2009), Shortness of breath, Skin cancer, Sleep apnea, 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  Surgical History:  She  has a past surgical history that includes hysterectomy; dexa,bone density,axial skeleton (02/21/2009); sinus surgery  ; vaginal hysterectomy; colonoscopy (2011); other surgical history; other surgical history; other surgical history (01/01/1992); other surgical history (06/2013); other surgical history (07/2013); total abdom hysterectomy; each add tooth extraction (05/16/2016); hx breast cancer (01/06/2017); needle biopsy left (12/08/2016); cholecystectomy (01/22/2019); lumpectomy left (01/06/2017); parathyroidectomy; other surgical history (06/23/2023); and colonoscopy (N/A, 4/30/2024).   Family History:  Her family history includes Breast Cancer (age of onset: 55) in her maternal aunt;  Cancer in an other family member; Coronary Artery Disease in her mother; Dementia in her mother; Diabetes in her sister; Heart Disorder in her mother; Other in her father and mother; partkinson's disease in her father.  Social History:  She  reports that she quit smoking about 40 years ago. Her smoking use included cigarettes. She started smoking about 58 years ago. She has a 17.4 pack-year smoking history. She has never used smokeless tobacco. She reports current alcohol use. She reports that she does not use drugs.    Tobacco:  She smoked tobacco in the past but quit greater than 12 months ago.  Social History     Tobacco Use   Smoking Status Former    Current packs/day: 0.00    Average packs/day: 1 pack/day for 17.4 years (17.4 ttl pk-yrs)    Types: Cigarettes    Start date: 1966    Quit date: 2/15/1984    Years since quittin.9   Smokeless Tobacco Never   Tobacco Comments    smoked for 14years         CAGE Alcohol Screen:   reviewed      Patient Care Team:  Codi Mirza MD as PCP - General (Family Medicine)  Federico Pina MD (GASTROENTEROLOGY)  Stephani Harrell PT as Physical Therapist  Tai Galeana MD (Cardiac Electrophysiology)  Robinson Padron MD (CARDIOLOGY)    Review of Systems  GENERAL: feels well otherwise  SKIN: denies any unusual skin lesions  EYES: denies blurred vision or double vision  HEENT: denies nasal congestion, sinus pain or ST  LUNGS: denies shortness of breath with exertion  CARDIOVASCULAR: denies chest pain on exertion  GI: denies abdominal pain, denies heartburn  : denies dysuria, vaginal discharge or itching, no complaint of urinary incontinence   MUSCULOSKELETAL: denies back pain, cramps feet  NEURO: denies headaches  PSYCHE: denies depression or anxiety  HEMATOLOGIC: denies hx of anemia  ENDOCRINE: denies thyroid history  ALL/ASTHMA: denies hx of allergy or asthma    Objective:   Physical Exam  General Appearance:  Alert, cooperative, no distress, appears  stated age   Head:  Normocephalic, without obvious abnormality, atraumatic   Eyes:  PERRL, conjunctiva/corneas clear, EOM's intact both eyes   Ears:  Normal TM's and external ear canals, both ears   Nose: Nares normal, septum midline,mucosa normal, no drainage or sinus tenderness   Throat: Lips, mucosa, and tongue normal; teeth and gums normal   Neck: Supple, symmetrical, trachea midline, no adenopathy;  thyroid: not enlarged, symmetric, no tenderness/mass/nodules; no carotid bruit or JVD   Back:   Symmetric, no curvature, ROM normal, no CVA tenderness   Lungs:   Clear to auscultation bilaterally, respirations unlabored   Heart:  Regular rate and rhythm, S1 and S2 normal, no murmur,   Abdomen:   Soft, non-tender, bowel sounds active all four quadrants,  no masses, no organomegaly   Pelvic: Deferred  Breast examination deferred   Extremities: Extremities normal, atraumatic, no cyanosis , +1 edema bilateral lower legs    Pulses: 2+ and symmetric   Skin: Skin color, texture, turgor normal, no rashes or lesions   Lymph nodes: Cervical, supraclavicular, and axillary nodes normal   Neurologic: Normal   Bilateral barefoot skin diabetic exam is normal, visualized feet with osteoarthritis changes in the feet some discoloration of the skin, mild edema  Bilateral monofilament/sensation of both feet is normal.  Pulsation pedal pulse exam of both lower legs/feet-slightly decreased        /70 (BP Location: Left arm, Patient Position: Sitting, Cuff Size: adult)   Pulse 56   Temp 96.7 °F (35.9 °C) (Temporal)   Resp 14   Ht 5' 6\" (1.676 m)   Wt 154 lb (69.9 kg)   LMP 09/29/1996   BMI 24.86 kg/m²  Estimated body mass index is 24.86 kg/m² as calculated from the following:    Height as of this encounter: 5' 6\" (1.676 m).    Weight as of this encounter: 154 lb (69.9 kg).    Medicare Hearing Assessment:   Hearing Screening    Time taken: 1/13/2025  9:19 AM  Screening Method: Finger Rub  Finger Rub Result: Pass         Visual  Acuity:   Right Eye Visual Acuity: Corrected Right Eye Chart Acuity: 20/70   Left Eye Visual Acuity: Corrected Left Eye Chart Acuity: 20/30   Both Eyes Visual Acuity: Corrected Both Eyes Chart Acuity: 20/30   Able To Tolerate Visual Acuity: Yes      Results for orders placed or performed in visit on 12/07/24   Magnesium [E]    Collection Time: 12/07/24 11:44 AM   Result Value Ref Range    Magnesium 1.9 1.6 - 2.6 mg/dL     Assessment & Plan:   Laura Mccarty is a 78 year old female who presents for a Medicare Assessment.     1. Encounter for annual health examination (Primary)  2. Hypertriglyceridemia  -     Comp Metabolic Panel (14); Future; Expected date: 01/13/2025  -     Lipid Panel; Future; Expected date: 01/13/2025  -     Urinalysis with Culture Reflex; Future; Expected date: 01/13/2025  -     Fenofibrate; Take 1 tablet (145 mg total) by mouth daily.  Dispense: 90 tablet; Refill: 1  3. Type 2 diabetes mellitus without complication, without long-term current use of insulin (HCC)  -     Hemoglobin A1C; Future; Expected date: 01/13/2025  -     Microalb/Creat Ratio, Random Urine; Future; Expected date: 01/13/2025  -     TSH W Reflex To Free T4; Future; Expected date: 01/13/2025  4. Osteopenia of hip, unspecified laterality  -     CBC With Differential With Platelet; Future; Expected date: 01/13/2025  5. Osteopenia of lumbar spine  6. Hyperparathyroidism (HCC)  7. COPD, mild (HCC)  8. Gastroesophageal reflux disease without esophagitis  9. Leg cramps  -     Magnesium; Future; Expected date: 01/13/2025  10. Hepatic cirrhosis, unspecified hepatic cirrhosis type, unspecified whether ascites present (HCC)  11. Pulmonary hypertension (HCC)  12. Recurrent pulmonary embolism (HCC)  13. SSS (sick sinus syndrome) (HCC)  14. BERNIE (obstructive sleep apnea)  15. History of ductal carcinoma in situ (DCIS) of breast  16. Loose stools  17. Neck pain    Gastroesophageal reflux disease on medication stable doing well    Hepatic  cirrhosis seen specialist monitor by them    Hyperparathyroidism monitored by specialist status post thyroidectomy monitor    Type 2 diabetes diet controlled check blood work for recommendation pending test results.  Sensation in the feet came back normal.    COPD mild patient is not using any medications recommended trial of Breo patient saw pulmonologist.,     Pulmonary hypertension on CPAP machine monitor.    Recurrent pulmonary embolism on Xarelto doing well seeing specialist    Screen for colon cancer seeing GI doctor management by them    Postmenopausal check DEXA scan further recommendation pending test result    Hypertriglyceridemia on medication stable she will call for refill if needed monitor.    Leg cramps better on magnesium and check blood work,  Increase magnesium dose to twice a day   If magnesium blood work is not showing too high numbers.    Lymphedema wear compressive stockings more regularly continue diuretic monitor.    Frequent PVCs patient is seeing cardiologist management by them    Obstructive sleep apnea on CPAP machine recommended follow-up with Dr. Lauren    History of DCIS stable monitor.    Sick sinus syndrome seeing electrophysiologist monitored by them.    Loose stools monitor for now use Imodium as needed.    Neck pain upper back pain will have patient to try warm compresses Biofreeze over-the-counter with warm compresses as needed.    Osteopenia\ hip monitor.  Monitor DEXA scan , calcium vitamin D supplementation.    RSV shot.   Continue current meds.   Watch diet for fats and carbs.   Stay active.    Do fasting blood work.  Try Biofreeze over-the-counter.  Use warm compresses.  Will adjust dose of magnesium after results are back.  Follow-up with GI doctor to discuss loose stools.  Follow-up with cardiologist as scheduled.  Follow-up with other specialists as recommended by them.    Imaging studies ordered.  Lab work ordered.  Reinforced healthy diet, lifestyle, and  exercise.      Return in about 6 months (around 7/13/2025).   Time spent was 60 minutes on visit and documentation. , from that 15  minutes spent on super visit 45 minutes spent on  reviewing patient's list of concerns and follow-up on medical problems.    Codi Mirza MD, 1/3/2024     Supplementary Documentation:   General Health:  In the past six months, have you lost more than 10 pounds without trying?: 2 - No  Has your appetite been poor?: No  Type of Diet: Balanced  How does the patient maintain a good energy level?: Daily Walks  How would you describe your daily physical activity?: Light  How would you describe your current health state?: Good  How do you maintain positive mental well-being?: Social Interaction  On a scale of 0 to 10, with 0 being no pain and 10 being severe pain, what is your pain level?: 0 - (None)  In the past six months, have you experienced urine leakage?: 1-Yes  At any time do you feel concerned for the safety/well-being of yourself and/or your children, in your home or elsewhere?: No  Have you had any immunizations at another office such as Influenza, Hepatitis B, Tetanus, or Pneumococcal?: No         Laura Mccarty's SCREENING SCHEDULE   Tests on this list are recommended by your physician but may not be covered, or covered at this frequency, by your insurer.   Please check with your insurance carrier before scheduling to verify coverage.   PREVENTATIVE SERVICES FREQUENCY &  COVERAGE DETAILS LAST COMPLETION DATE   Diabetes Screening    Fasting Blood Sugar /  Glucose    One screening every 12 months if never tested or if previously tested but not diagnosed with pre-diabetes   One screening every 6 months if diagnosed with pre-diabetes Lab Results   Component Value Date    GLU 89 09/30/2024        Cardiovascular Disease Screening    Lipid Panel  Cholesterol  Lipoprotein (HDL)  Triglycerides Covered every 5 years for all Medicare beneficiaries without apparent signs or symptoms of  cardiovascular disease Lab Results   Component Value Date    CHOLEST 126 08/20/2024    HDL 36 (L) 08/20/2024    LDL 58 08/20/2024    TRIG 192 (H) 08/20/2024         Electrocardiogram (EKG)   Covered if needed at Welcome to Medicare, and non-screening if indicated for medical reasons 07/05/2023      Ultrasound Screening for Abdominal Aortic Aneurysm (AAA) Covered once in a lifetime for one of the following risk factors    Men who are 65-75 years old and have ever smoked    Anyone with a family history -     Colorectal Cancer Screening  Covered for ages 50-85; only need ONE of the following:    Colonoscopy   Covered every 10 years    Covered every 2 years if patient is at high risk or previous colonoscopy was abnormal 04/30/2024    Health Maintenance   Topic Date Due    Colorectal Cancer Screening  04/30/2027       Flexible Sigmoidoscopy   Covered every 4 years -    Fecal Occult Blood Test Covered annually -   Bone Density Screening    Bone density screening    Covered every 2 years after age 65 if diagnosed with risk of osteoporosis or estrogen deficiency.    Covered yearly for long-term glucocorticoid medication use (Steroids) Last Dexa Scan:    XR DEXA BONE DENSITOMETRY (CPT=77080) 01/15/2024      No recommendations at this time   Pap and Pelvic    Pap   Covered every 2 years for women at normal risk; Annually if at high risk -  No recommendations at this time    Chlamydia Annually if high risk -  No recommendations at this time   Screening Mammogram    Mammogram     Recommend annually for all female patients aged 40 and older    One baseline mammogram covered for patients aged 35-39 07/22/2024    Health Maintenance   Topic Date Due    Mammogram  Discontinued       Immunizations    Influenza Covered once per flu season  Please get every year 10/25/2024  No recommendations at this time    Pneumococcal Each vaccine ([id number] & [id number]) covered once after 65 Prevnar 13: 10/12/2017    [id number]: 10/01/2014      No recommendations at this time    Hepatitis B One screening covered for patients with certain risk factors   -  No recommendations at this time    Tetanus Toxoid Not covered by Medicare Part B unless medically necessary (cut with metal); may be covered with your pharmacy prescription benefits -    Tetanus, Diptheria and Pertusis TD and TDaP Not covered by Medicare Part B -  No recommendations at this time    Zoster Not covered by Medicare Part B; may be covered with your pharmacy  prescription benefits 10/17/2012  No recommendations at this time     Diabetes      Hemoglobin A1C Annually; if last result is elevated, may be asked to retest more frequently.    Medicare covers every 3 months Lab Results   Component Value Date     (H) 08/20/2024    A1C 6.5 (H) 08/20/2024       No recommendations at this time    Creat/alb ratio Annually Lab Results   Component Value Date    MICROALBCREA  08/20/2024      Comment:      Unable to calculate due to Urine Microalbumin <0.3 mg/dL           LDL Annually Lab Results   Component Value Date    LDL 58 08/20/2024       Dilated Eye Exam Annually Last Diabetic Eye Exam:  Last Dilated Eye Exam: 11/09/24  Eye Exam shows Diabetic Retinopathy?: No       Annual Monitoring of Persistent Medications (ACE/ARB, digoxin diuretics, anticonvulsants)    Potassium Annually Lab Results   Component Value Date    K 4.2 09/30/2024         Creatinine   Annually Lab Results   Component Value Date    CREATSERUM 0.88 09/30/2024         BUN Annually Lab Results   Component Value Date    BUN 19 09/30/2024       Drug Serum Conc Annually No results found for: \"DIGOXIN\", \"DIG\", \"VALP\"           Chronic Obstructive Pulmonary Disease (COPD)    Spirometry Annually Spirometry date: 04/07/2017

## 2025-01-13 NOTE — PATIENT INSTRUCTIONS
RSV shot.   Continue current meds.   Watch diet for fats and carbs.   Stay active.    Do fasting blood work.  Try Biofreeze over-the-counter.  Use warm compresses.  Will adjust dose of magnesium after results are back.  Follow-up with GI doctor to discuss loose stools.  Follow-up with cardiologist as scheduled.  Follow-up with other specialists as recommended by them.          Refill policies:      Allow 3 business days for refills; controlled substances may take longer.  Contact your pharmacy at least 5-7 business days prior to running out of medication and have them send an electronic request or submit through the \"request refill\" option thru your [employer] account. No need to do both, as multiple requests will create an automated [employer] message to notify of a denial for one of the duplicated requests, causing you undue confusion.   Refills are NOT addressed on weekends; covering physicians do not authorize routine medications on weekends.  No narcotics or controlled substances are refilled after noon on Fridays or by on call physicians.  By law, narcotics cannot be faxed or phoned into your pharmacy.  If your prescription is due for a refill, you may be due for a follow up appointment. Please call our office at [phone] to make an appointment or schedule an appointment via [employer].  To best provide you care, patients receiving routine medications need to be seen at least twice a year. Patients receiving narcotic/controlled substance medications need to be seen at least once every 3 months.  In the event that your preferred pharmacy does not have the requested medication in stock (ie Backordered), it is your responsibility to find another pharmacy that has the requested medication available. We will gladly send a new prescription to that pharmacy at your request.  controlled substances may not be able to be filled out of state due to license restrictions.  If you have a planned trip, it's best to call your  pharmacy at least 5-7 business days to prevent any delays in your medication refill.    Scheduling Tests:    If your physician has ordered radiology tests such as MRI or CT scans, please contact Central Scheduling at [phone] right away to schedule the test.  Once scheduled, the [facility] Centralized Referral Team will work with your insurance carrier to obtain pre-certification or prior authorization.  Depending on your insurance carrier, approval may take 3-10 days.  It is highly recommended patients assure they have received an authorization before having a test performed.  If test is done without insurance authorization, patient may be responsible for the entire amount billed.      Precertification and Prior Authorizations:  If your physician has recommended that you have a procedure or additional testing performed the [facility] Centralized Referral Team will contact your insurance carrier to obtain pre-certification or prior authorization.    You are strongly encouraged to contact your insurance carrier to verify that your procedure/test has been approved and is a COVERED benefit.  Although the [facility] Centralized Referral Team does its due diligence, the insurance carrier gives the disclaimer that \"Although the procedure is authorized, this does not guarantee payment.\"    Ultimately the patient is responsible for payment.   Thank you for your understanding in this matter.  Paperwork Completion:  If you require FMLA or disability paperwork for your recovery, please make sure to either drop it off or have it faxed to our office at [phone]. Be sure the form has your name and date of birth on it.  The form will be faxed to our Forms Department and they will complete it for you.  There is a 25$ fee for all forms that need to be filled out.  Please be aware there is a 10-14 day turnaround time.  You will need to sign a release of information (JOSE) form if your paperwork does not come with one.  You may call the Forms  Department with any questions at [phone].  Their fax number is [phone].

## 2025-01-15 NOTE — TELEPHONE ENCOUNTER
Patient signed medical records request form in office to receive records from   Dr Jalil Zambrano. Request form faxed to [phone] on 01/13/2025.    Request form also sent to scanning to be scanned to patient chart.

## 2025-07-02 NOTE — PATIENT INSTRUCTIONS
Do chest x-ray.  Do blood work.  Monitor symptoms.  Will make further recommendations after results are back.

## 2025-07-02 NOTE — PROGRESS NOTES
Laura Mccarty is a 78 year old female.  HPI:   Patient started not to feel well 3 days ago.  She notes to have episodes of tightness,  some chest pain on the right side of the chest in the middle of the chest.  One time the pain lasted for day and a half.  Felt out of breath when doing things when walking.  No fevers no chills no cough.  In the past she had some breathing difficulty and her previous primary care doctor gave her Breo inhaler.  She did not use the medication.  Urination and bowel movements are not changed.  Patient has history of blood clots.  She has history of PVCs//sick sinus syndrome, seeing cardiologist.  Denies any heart palpitations or arrhythmia.  No congestion or runny nose.  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  She noticed that when walking with him outside trying to assist him she gets little bit more tired out of breath.    Patient has a history of pulmonary embolus and recurrent clots.  She is on Xarelto.  Will check D-dimer to make sure there is no clot.  Patient was concerned that she \"could drop dead \"  especially when her  needs her help.    Per records patient was previously diagnosed with mild COPD.  Will proceed with x-ray of the chest for evaluation to make sure patient not developing any infection or exacerbation of her condition.    Current Medications[1]   Past Medical History[2]   Social History:  Short Social Hx on File[3]     REVIEW OF SYSTEMS:   GENERAL HEALTH: feels well otherwise  SKIN: denies any unusual skin lesions or rashes  HEENT no congestion no runny nose no sore throat  Neck no neck pain psych normal mood  RESPIRATORY: No cough, has some shortness of breath with exertion  CARDIOVASCULAR: Had episode of chest tightness pressure on the right side of the chest in the middle of the chest  GI: denies abdominal pain and denies heartburn  NEURO: denies headaches  Psych normal mood    EXAM:   /74 (BP Location: Left arm, Patient  Position: Sitting, Cuff Size: adult)   Pulse 73   Temp 96.9 °F (36.1 °C) (Temporal)   Resp 18   Ht 5' 6\" (1.676 m)   Wt 154 lb 9.6 oz (70.1 kg)   LMP 09/29/1996   SpO2 96%   BMI 24.95 kg/m²   GENERAL: well developed, well nourished,in no apparent distress  SKIN: no rashes,no suspicious lesions  HEENT: atraumatic, normocephalic,ears and throat are clear  NECK: supple,no adenopathy,  LUNGS: clear to auscultation, few crackles at the bases slight coarseness with cough  Chest wall there is some tenderness in the applying pressure, no tenderness on the right side  lower sternum area on  CARDIO: RRR without murmur  GI: good BS's,no masses, HSM or tenderness  EXTREMITIES: no  edema  Psychiatric - alert  and oriented x3, normal mood      Results for orders placed or performed in visit on 07/02/25   EKG with interpretation and Report -IN OFFICE [[id number]]    Collection Time: 07/02/25  4:55 PM   Result Value Ref Range    Ventricular rate 52 BPM    Atrial rate 52 BPM    P-R Interval 160 ms    QRS Duration 86 ms    Q-T Interval 450 ms    QTC Calculation (Bezet) 418 ms    P Axis 60 degrees    R Axis -41 degrees    T Axis 2 degrees      Sinus bradycardia  Left axis deviation  ST & T wave abnormality, consider anterior ischemia  Abnormal ECG  When compared with ECG of 04-JUL-2023 12:54,  Inverted T waves have replaced nonspecific T wave abnormality in Anterior leads  Confirmed by Codi Mirza ([id number]) on 7/2/2025 12:41:31 PM     Specimen Collected: 07/02/25  4:55 PM Last Resulted: 07/02/25 12:41 PM         Chest x-ray reviewed patient notified.  PROCEDURE: XR CHEST PA + LAT CHEST (CPT=71046)    INDICATIONS: DX-R07.2 Precordial pain;DX-R07.89 Tightness in chest;DX-R06.02 Shortness of breath;    PATIENT STATED HISTORY: Patient shares she has chest tightness and shortness of breath. In past 2 days she had chest pain but that has resolved.    COMPARISON: 7/4/2023    FINDINGS: Normal heart size and pulmonary vascularity. No  pleural effusion or pneumothorax. No lobar consolidation. Mild scarring/atelectasis in the lower lungs. Tortuosity of the thoracic aorta. Surgical clips in the right upper abdomen. Surgical clips  along the left breast. Hyperinflation of the lungs suggesting the possibility of underlying COPD and/or asthma. Clinical correlation recommended.    Impression   CONCLUSION: Hyperinflation of the lungs suggesting the possibility of underlying COPD and/or asthma. Clinical correlation recommended.              Electronically Verified and Signed by Attending Radiologist: Catalino Lundberg MD 7/2/2025 12:46 PM  Workstation: [id number]       Blood test done today reviewed:    D-dimer negative    Troponin -level negative    CBC -platelet 474, monocytes 1.03 otherwise unremarkable    CMP -AST mildly elevated at 40 otherwise unremarkable    ASSESSMENT AND PLAN:     Encounter Diagnoses   Name Primary?    Shortness of breath Yes    Tightness in chest     Precordial pain     COPD, mild (HCC)     Recurrent pulmonary embolism (HCC)     Lymphedema of both lower extremities     Frequent PVCs     ST segment abnormality     Bradycardia        Orders Placed This Encounter   Procedures    CBC With Differential With Platelet    Comp Metabolic Panel (14)    Troponin I (High Sensitivity)    D-Dimer [E]       Meds & Refills for this Visit:  Requested Prescriptions      No prescriptions requested or ordered in this encounter   Do chest x-ray.  Do blood work.  Monitor symptoms.  Will make further recommendations after results are back.    EKG shows ST changes and sinus bradycardia troponin was ordered to rule out cardiac etiology.  Since troponin is negative and patient symptoms persist for the last 2 to 3 days doubt cardiac etiology of her symptoms.    D-dimer came back negative which would exclude blood clots as a cause of patient's symptoms.    Chest x-ray shows hyperinflation of the lungs which could be seen in COPD/asthma situation.  Patient had  diagnosis of mild asthma on the chart previously was getting Breo.  Will have patient to try Breo inhaler once a day and monitor symptoms.    Imaging & Consults:  ELECTROCARDIOGRAM, COMPLETE   The patient indicates understanding of these issues and agrees to the plan.  The patient is asked to return in prn depending on her clinical improvement.  Time spent was 40 minutes spend  on  visit and documentation.          [1]   Current Outpatient Medications   Medication Sig Dispense Refill    fenofibrate 145 MG Oral Tab Take 1 tablet (145 mg total) by mouth daily. 90 tablet 0    magnesium oxide 400 MG Oral Tab Take 1 tablet (400 mg total) by mouth 2 (two) times daily. 180 tablet 1    cycloSPORINE 0.05 % Ophthalmic Emulsion Apply 1 drop to eye 2 (two) times daily.      estradiol (ESTRACE) 0.1 MG/GM Vaginal Cream Apply a small (pea-sized) amount to periurethral region three times weekly 42.5 g 5    methenamine 1 g Oral Tab Take 1 tablet (1 g total) by mouth 2 (two) times daily. 180 tablet 3    erythromycin 5 MG/GM Ophthalmic Ointment APPLY INTO BOTH EYES 3 TIMES A DAY      Doxycycline Monohydrate 50 MG Oral Cap Take by mouth 2 (two) times daily.      ipratropium 0.03 % Nasal Solution 2 sprays by Nasal route 3 (three) times daily as needed.      donepezil 10 MG Oral Tab Take 1 tablet (10 mg total) by mouth nightly.      furosemide 20 MG Oral Tab Take 1 tablet (20 mg total) by mouth 3 (three) times a week.      loperamide 2 MG Oral Cap Take 1 capsule (2 mg total) by mouth as needed for Diarrhea.      VITAMIN D, CHOLECALCIFEROL, OR Take by mouth daily.      XARELTO 20 MG Oral Tab Take 1 tablet (20 mg total) by mouth daily with food. 90 tablet 3    ALBUTEROL 108 (90 Base) MCG/ACT Inhalation Aero Soln INHALE 2 PUFFS INTO THE LUNGS EVERY 4 TO 6 HOURS AS NEEDED FOR WHEEZING 6.7 each 0    metroNIDAZOLE 0.75 % External Lotion APPLY SMALL AMOUNT TO FULL FACE ONCE DAILY AFTER CLEANSING      Niacin (VITAMIN B-3 OR) Take 1 tablet by  mouth daily.      BREO ELLIPTA 100-25 MCG/INH Inhalation Aerosol Powder, Breath Activated TAKE 1 PUFF BY MOUTH EVERY DAY 3 each 0    PANTOPRAZOLE 40 MG Oral Tab EC TAKE 1 TABLET (40 MG TOTAL) BY MOUTH 2 (TWO) TIMES DAILY BEFORE MEALS. 180 tablet 1    Fluticasone Propionate 50 MCG/ACT Nasal Suspension 2 sprays by Each Nare route daily. 3 Bottle 3    Glucos-Chond-Hyal Ac-Ca Fructo (MOVE FREE [employer] HEALTH ADVANCE) Oral Tab Take 1 tablet by mouth daily.      Metoprolol Succinate (TOPROL XL) 50 MG Oral Tablet SR 24 Hr Take 1 tablet (50 mg total) by mouth nightly.     [2]   Past Medical History:   Anesthesia complication    had hallucinations from Propofol long time ago, no issues recently    Arrhythmia    BACK PAIN    Breast cancer (HCC)    DCIS    C. difficile diarrhea    CANCER    skin cancer    CKD (chronic kidney disease) stage 3, GFR 30-59 ml/min (HCC)    Deep vein thrombosis (HCC)    right leg    Diabetes (HCC)    Disorder of liver    fatty liver, hepatic cirrhosis    Esophageal reflux    Frequent PVCs    GERD    H/O colonoscopy    H/O endoscopic sinus surgery    History of blepharoplasty    upper lid w/ excessive skin    Hypercalcemia    Malignant neoplasm of left female breast (HCC)    Obstructive apnea    [place] HST AHI 7.4 autoPAP 6-16 Prism    Osteoarthritis    Post-ERCP acute pancreatitis (HCC)    Pulmonary embolism (HCC)    Recurrent pulmonary embolism (HCC)    Renal disorder    Routine Papanicolaou smear    Shortness of breath    Skin cancer    Sleep apnea    cpap    Squamous cell carcinoma in situ of skin of neck    Type 2 diabetes mellitus without complication, without long-term current use of insulin (HCC)    URINARY PROBLEMS    Visual impairment    contacts/glasses   [3]   Social History  Socioeconomic History    Marital status:    Tobacco Use    Smoking status: Former     Current packs/day: 0.00     Average packs/day: 1 pack/day for 17.4 years (17.4 ttl pk-yrs)     Types: Cigarettes     Start  date: 1966     Quit date: 2/15/1984     Years since quittin.4    Smokeless tobacco: Never    Tobacco comments:     smoked for 14years    Vaping Use    Vaping status: Never Used   Substance and Sexual Activity    Alcohol use: Yes     Comment: 2 glasses a month    Drug use: No   Other Topics Concern    Caffeine Concern Yes     Comment: 3 a week     Exercise Yes     Comment: 3 x a week      Social Drivers of Health     Transportation Needs: No Transportation Needs (2023)    Transportation Needs     Lack of Transportation: No

## 2025-07-02 NOTE — TELEPHONE ENCOUNTER
Roney from Radiology called to inform Dr. Mirza of patient's chest x-ray result done today for review.     CONCLUSION: Hyperinflation of the lungs suggesting the possibility of underlying COPD and/or asthma. Clinical correlation recommended.

## 2025-07-05 NOTE — ED PROVIDER NOTES
Patient Seen in: [facility] Emergency Department        History  Chief Complaint   Patient presents with    Chest Pain    Shortness Of Breath     Stated Complaint: ambulatory to  c/o of chest heaviness x2 days with SOB. +Cardiac hx per patie*    Subjective:   HPI            78-year-old female past medical history as below presents with chest pain.  X 2.5 days.  Described as a heaviness or central chest pressure.  Constant with no obvious exacerbating or alleviating factors.  Has noticed some worsening dyspnea on exertion over this time.  No nausea or vomiting or diaphoresis.  No leg swelling or pain.  No fevers or cough.      Objective:     Past Medical History:    Anesthesia complication    had hallucinations from Propofol long time ago, no issues recently    Arrhythmia    BACK PAIN    Breast cancer (HCC)    DCIS    C. difficile diarrhea    CANCER    skin cancer    CKD (chronic kidney disease) stage 3, GFR 30-59 ml/min (HCC)    Deep vein thrombosis (HCC)    right leg    Diabetes (HCC)    Disorder of liver    fatty liver, hepatic cirrhosis    Esophageal reflux    Frequent PVCs    GERD    H/O colonoscopy    H/O endoscopic sinus surgery    History of blepharoplasty    upper lid w/ excessive skin    Hypercalcemia    Malignant neoplasm of left female breast (HCC)    Obstructive apnea    [place] HST AHI 7.4 autoPAP 6-16 Prism    Osteoarthritis    Post-ERCP acute pancreatitis (HCC)    Pulmonary embolism (HCC)    Recurrent pulmonary embolism (HCC)    Renal disorder    Routine Papanicolaou smear    Shortness of breath    Skin cancer    Sleep apnea    cpap    Squamous cell carcinoma in situ of skin of neck    Type 2 diabetes mellitus without complication, without long-term current use of insulin (HCC)    URINARY PROBLEMS    Visual impairment    contacts/glasses              Past Surgical History:   Procedure Laterality Date    Cholecystectomy  01/22/2019    Colonoscopy  2011    due in 2021    Colonoscopy N/A  4/30/2024    Procedure: COLONOSCOPY W/ COLD SNARE POLYPECTOMY;  Surgeon: Federico Pina MD;  Location:  ENDOSCOPY    Dexa,bone density,axial skeleton  02/21/2009    Each add tooth extraction  05/16/2016    Hx breast cancer  01/06/2017    DCIS    Hysterectomy      Lumpectomy left  01/06/2017    DCIS-Lumpectomy @ [facility]    Needle biopsy left  12/08/2016    DCIS    Other surgical history      birch and sling bladder sx, liposuction and tummy tuck    Other surgical history      esophagogastroduodenoscopy    Other surgical history  01/01/1992    nasal septal deviation repair    Other surgical history  06/2013    vaginal lift     Other surgical history  07/2013    bladder sling     Other surgical history  06/23/2023    gallstone removal    Parathyroidectomy      Sinus surgery        Total abdom hysterectomy      Vaginal hysterectomy                  No pertinent social history.                              Physical Exam    ED Triage Vitals [07/05/25 1347]   /73   Pulse 70   Resp 19   Temp 97.3 °F (36.3 °C)   Temp src Temporal   SpO2 95 %   O2 Device None (Room air)       Current Vitals:   Vital Signs  BP: 117/73  Pulse: 70  Resp: 19  Temp: 97.3 °F (36.3 °C)  Temp src: Temporal    Oxygen Therapy  SpO2: 95 %  O2 Device: None (Room air)            Physical Exam  Constitutional:       General: Is not in acute distress.     Appearance: Is not ill-appearing.   HENT:      Head: Normocephalic and atraumatic.      Mouth/Throat:      Mouth: Mucous membranes are moist.   Eyes:      Conjunctiva/sclera: Conjunctivae normal.      Pupils: Pupils are equal, round, and reactive to light.   Cardiovascular:      Rate and Rhythm: Normal rate and regular rhythm.   Pulmonary:      Effort: Pulmonary effort is normal. No respiratory distress.      Breath sounds: Normal breath sounds.   Abdominal:      General: Abdomen is flat. There is no distension.      Tenderness: There is no abdominal tenderness.   Musculoskeletal:      Right lower  leg: No edema.      Left lower leg: No edema.   Skin:     General: Skin is warm and dry.   Neurological:      General: No focal deficit present.      Mental Status: Is alert.           ED Course  Labs Reviewed   CBC WITH DIFFERENTIAL WITH PLATELET - Abnormal; Notable for the following components:       Result Value    .0 (*)     All other components within normal limits   COMP METABOLIC PANEL (14) - Abnormal; Notable for the following components:    Glucose 112 (*)     Creatinine 1.10 (*)     Calculated Osmolality 298 (*)     eGFR-Cr 51 (*)     Alkaline Phosphatase 34 (*)     All other components within normal limits   TROPONIN I HIGH SENSITIVITY - Normal   LIPASE - Normal   PRO BETA NATRIURETIC PEPTIDE - Normal   D-DIMER - Normal   RAINBOW DRAW BLUE     EKG    Rate, intervals and axes as noted on EKG Report.  Rate: 60  Rhythm: Normal sinus rhythm  Reading: No ST elevation or depression, T wave version in III - unchanged 7/4/23                                MDM     Considered all emergent etiologies, differential includes but is not limited to: ACS, PE, pneumonia     I have independently visualized the radiology images, my focus/limited interpretation: Chest x-ray negative for pneumonia or pneumothorax     Defer to radiologist for other/incidental findings    EKG nonischemic.  Labs unremarkable including negative high-sensitivity troponin and negative D-dimer.  Not concern for ACS or PE.  Patient is low risk heart score.  She does follow with an outpatient cardiologist and advised close follow-up for further evaluation.  Discussed red flags and return precaution as well as differential at length        Medical Decision Making      Disposition and Plan     Clinical Impression:  1. Chest pain of uncertain etiology         Disposition:  Discharge  7/5/2025  3:07 pm    Follow-up:  Codi Mirza MD  [address] [place] [id number] [phone]    Follow up            Medications  Prescribed:  Current Discharge Medication List                Supplementary Documentation:

## 2025-07-22 NOTE — TELEPHONE ENCOUNTER
Requested Prescriptions     Pending Prescriptions Disp Refills    MAGNESIUM OXIDE -MG SUPPLEMENT 400 (240 Mg) MG Oral Tab [Pharmacy Med Name: MAGNESIUM OXIDE 400 MG TABLET] 180 tablet 0     Sig: TAKE 1 TABLET BY MOUTH TWICE A DAY     Last refill 1/17/25 #180 x 1   LOV 7/2/25  Future Appointments   None                No refill protocol

## (undated) NOTE — MR AVS SNAPSHOT
After Visit Summary   3/10/2017    David Christianson    MRN: [id number]           Diagnoses this Visit     Recurrent pulmonary embolism ([facility]    -  Primary     Ductal carcinoma in situ (DCIS) of right breast         Dyspnea on exertion           Aller 4/21/14 Lasix-pt has been holding past few days                      Patient Instructions     None      Please Note     If a lab draw is part of your appointment, please arrive 15 minutes early.       Follow-up Instructions     Return in about 6 weeks (ar

## (undated) NOTE — ED AVS SNAPSHOT
Laura Thorpe   MRN: [id number]    Department:  [facility] Emergency Department   Date of Visit:  6/1/2018           Disclosure     Insurance plans vary and the physician(s) referred by the ER may not be covered by your plan.  Please contact your tell this physician (or your personal doctor if your instructions are to return to your personal doctor) about any new or lasting problems. The primary care or specialist physician will see patients referred from the [facility] Emergency Department.  Elisabeth Bentley

## (undated) NOTE — MR AVS SNAPSHOT
After Visit Summary   4/21/2017    Kami Hirsch    MRN: [id number]           Diagnoses this Visit     Rec[facility]    -  Primary     Ductal carcinoma in situ (DCIS) of right breast         Dyspnea on exertion           Aller Metoprolol Succinate (TOPROL XL) 50 MG Oral Tablet SR 24 Hr Take 50 mg by mouth nightly.       Medication Comments      4/21/14 Lasix-pt has been holding past few days                      Patient Instructions     None      Please Note     If a lab draw is

## (undated) NOTE — Clinical Note
Brad Gonzalez- do you believe Tiki Aragon can come off her Xarelto safely? She states it has been over 5 years since her last event. She has had past PE's and a DVT.     Ailyn Linares

## (undated) NOTE — MR AVS SNAPSHOT
[facility], [address] [address] [id number]               Thank you for choosing us for your health care visit with Jj Mcpherson MD.  We are glad to serve you and happy to provide you with this marcus LASIX 20 MG Tabs   Generic drug:  furosemide   Take 20 mg by mouth See Admin Instructions. Uses 3-4 x a week           Mometasone Furoate 220 MCG/INH Aepb   Inhale 1 puff into the lungs daily.    Commonly known as:  Shai Caceres

## (undated) NOTE — MR AVS SNAPSHOT
After Visit Summary   8/2/2017    Bridger Paul    MRN: [id number]           Visit Information     Date & Time  8/2/2017 11:00 AM Provider  SCHEDULE BY [facility] Department  [address] [facility] Dept.  Phone  [phone] Were Glucos-Chond-Hyal Ac-Ca Fructo (MOVE FREE JOINT HEALTH ADVANCE) Oral Tab Take by mouth. Fluticasone Propionate 50 MCG/ACT Nasal Suspension 2 sprays by Each Nare route daily. Vitamin Mixture (MICHAEL-C OR) Take 1,000 mg by mouth daily.     Dicyclomine H

## (undated) NOTE — MR AVS SNAPSHOT
After Visit Summary   4/6/2017    Rosario Sanches    MRN: [id number]           Visit Information     Date & Time  4/6/2017 11:00 AM Provider   [facility] Dept.  Phone  [phone] Metoprolol Succinate (TOPROL XL) 50 MG Oral Tablet SR 24 Hr Take 50 mg by mouth nightly.       Diagnoses for This Visit    Dyspnea   [899824]             We Ordered the Following     Normal Orders This Visit    PFT MISCELLANEOUS [PFT1 CUSTOM]       Future Treatment for mild   illness or injury that does   not require immediate attention   VIDEO VISITS  Average cost  $35*    e-VISITS  Average cost  $35*      [address]  Monday - Friday  10:

## (undated) NOTE — LETTER
Patient Name: Britt Santos DOB-Age 6102-T: 68 y Sex: female   MRN: [id number] CSN: [id number]      ANTIBIOTIC ALLERGY/SENSITIVITY/CONTRAINDICATION  Surgery Date:  2023  Procedure: ENDOSCOPIC RETROGRADE CHOLANGIOPANCREATOGRAPHY (ERCP)  Anesthesia Type: MAC  Surgeon(s):  Pia March MD    Allergy Reaction: Allergy/Contraindication:  (UNASYN) ampicillin-sulbactam    Reactions: HIVES, OTHER (SEE COMMENTS), RASH. No reaction type specified. User documented allergy severity: High. Level 3 with CEFUROXIME (Class: CEPHALOSPORINS). The above patient has an allergy/sensitivity or contraindication to the antibiotic ordered from your standing orders/[facility] Pre-op Standing orders/[facility] Adult Preoperative Prophylactic Protocol listed below. If you would like the medication given, please fax this form back indicating the override reason with a physician signature/date/and time.      ___  Benefit outweighs risk ___  Insignificant ___  Low risk      ___ Not a true allergy  ___  Dose appropriate ___  Tolerated regimen in the past   If you prefer to order an alternate antibiotic please list drug, dose, route and time to administer below:     _____________________________________  _______    ___________   __________________  Antibiotic                                                                                        Dose                 Route                        Time of administration    ________________________________________Date____________Time________  (MD signature)  Fax this form back to [phone]

## (undated) NOTE — LETTER
Date: 12/27/2019    Patient Name: Lillie Joycer          To Whom it may concern: This letter has been written at the patient's request. The above patient was seen at the [facility] for treatment of a medical condition.     This patient sh

## (undated) NOTE — MR AVS SNAPSHOT
[facility], [address] [address] [phone]               Thank you for choosing us for your health care visit with Suzie Fernandez PA-C.   We are glad to serve you and happy to provide you with this · Abdominal discomfort. This is usually in the lower abdomen above the pubic bone.   · Cloudy urine  · Strong- or bad-smelling urine  · Unable to urinate (urinary retention)  · Unable to hold urine in (urinary incontinence)  · Fever  · Loss of appetite  · C clothing. Care and prevention  These self-care steps can help prevent future infections:  · Drink plenty of fluids to prevent dehydration and flush out your bladder.  Do this unless you must restrict fluids for other health reasons, or your doctor told you Date Last Reviewed: 10/1/2016  © 1533-7033 The [address] [place] [address], [address] [place], [place] [address]MalcolmJose Raul Kramer. All rights reserved. This information is not intended as a substitute for professional medical care.  Always follow your healthcare professional Nitrofurantoin Monohyd Macro 100 MG Caps   Take 1 capsule (100 mg total) by mouth 2 (two) times daily. Commonly known as:  MACROBID           Pantoprazole Sodium 40 MG Tbec   Take 1 tablet (40 mg total) by mouth 2 (two) times daily before meals.  Before view more details from this visit by going to [email or web address]. If you've recently had a stay at the Hospital you can access your discharge instructions in [employer] by going to Visits < Admission Summaries.  If you've been to the Emergency Depar

## (undated) NOTE — LETTER
Patient Name: Dalton Boone  YOB: 1946          MRN number:  [id number]  Date:  11/29/2021  Referring Physician:  Nicole Blizzard    Discharge Summary  Initial Functional Outcome Score 69/100  Final Functional Outcome Score 83/100  Number

## (undated) NOTE — MR AVS SNAPSHOT
[facility], [address] [address] [phone]               Thank you for choosing us for your health care visit with Yanira Osborn MD.  We are glad to serve you and happy to provide you with this marcus Take 1 tablet by mouth daily. Dicyclomine HCl 10 MG Caps   Take 10 mg by mouth as needed. Commonly known as:  BENTYL           MICHAEL-C OR   Take 1,000 mg by mouth daily. Fenofibrate 145 MG Tabs   TAKE 1 TABLET BY MOUTH ONCE DAILY.    C office, you can view your past visit information in [employer] by going to Visits < Visit Summaries. [employer] questions? Call [phone] for help. [employer] is NOT to be used for urgent needs. For medical emergencies, dial 911.            Visit EDWARD-EL

## (undated) NOTE — LETTER
Patient Name: Perri Sandhoff  -Age 7835-C: 68 y Sex: female   MRN: [id number] CSN: [id number]      ANTIBIOTIC ALLERGY/SENSITIVITY/CONTRAINDICATION  Surgery Date:  2023  Procedure: ENDOSCOPIC RETROGRADE CHOLANGIOPANCREATOGRAPHY (ERCP)  Anesthesia Type: MAC  Surgeon(s):  Ashley Samaniego MD    Allergy Reaction: Patient is allergic to Cefuroxime with a reaction of hives and rash and therefore is contraindicated to the Unasyn order you provided. Patient is also allergic to Levofloxacin, Vancomycin, Bacitracin, and Sulfa drugs cross reactor. The above patient has an allergy/sensitivity or contraindication to the antibiotic ordered from your standing orders/[facility] Pre-op Standing orders/[facility] Adult Preoperative Prophylactic Protocol listed below. If you would like the medication given, please fax this form back indicating the override reason with a physician signature/date/and time.      ___  Benefit outweighs risk ___  Insignificant ___  Low risk      ___ Not a true allergy  ___  Dose appropriate ___  Tolerated regimen in the past   If you prefer to order an alternate antibiotic please list drug, dose, route and time to administer below:     _____________________________________  _______    ___________   __________________  Antibiotic                                                                                        Dose                 Route                        Time of administration    ________________________________________Date____________Time________  (MD signature)  Fax this form back to [phone]

## (undated) NOTE — MR AVS SNAPSHOT
After Visit Summary   4/12/2018    Adam Rios    MRN: [id number]           Visit Information     Date & Time  4/12/2018 10:00 PM Provider  [address] Dept[address]  Phone  [phone] Were Fluticasone Propionate 50 MCG/ACT Nasal Suspension 2 sprays by Each Nare route daily. Furosemide (LASIX) 20 MG Oral Tab Take 20 mg by mouth See Admin Instructions.  Uses 3-4 x a week     Metoprolol Succinate (TOPROL XL) 50 MG Oral Tablet SR 24 Hr Take 5 online. The physician will respond and provide a treatment plan within a few hours.            Treatment for mild   illness or injury that does   not require immediate attention   VIDEO VISITS  Average cost  $35*    e-VISITS  Average cost  $35*      [facility]

## (undated) NOTE — MR AVS SNAPSHOT
[facility], [address] [address] [phone]               Thank you for choosing us for your health care visit with Fior Kraft MD.  We are glad to serve you and happy to provide you with this marcus Sulfa Drugs Cross Reactors Rash, Nausea and vomiting                Today's Vital Signs     BP Pulse Temp Weight          112/70 mmHg 80 99.1 °F (37.3 °C) 154 lb           Current Medications          This list is accurate as of: 5/19/17  2:24 PM.  Always Take 20 mg by mouth daily with food. Where to Get Your Medications      These medications were sent to [facility]Y #[id number]- [address] [facility]. [facility] [address], [phone], [address]  [facility] [address],

## (undated) NOTE — LETTER
Patient Name: Peg YODER-Age 7/15/3526-F: 68 y Sex: female   MRN: [id number] CSN: [id number]      ANTIBIOTIC ALLERGY/SENSITIVITY/CONTRAINDICATION  Surgery Date:  2023  Procedure: ENDOSCOPIC RETROGRADE CHOLANGIOPANCREATOGRAPHY (ERCP)  Anesthesia Type: MAC  Surgeon(s):  Jose Luis Saleem MD    Allergy Reaction:Levaquin- Leg cramps    The above patient has an allergy/sensitivity or contraindication to the antibiotic ordered from your standing orders/[facility] Pre-op Standing orders/[facility] Adult Preoperative Prophylactic Protocol listed below. If you would like the medication given, please fax this form back indicating the override reason with a physician signature/date/and time.        ___  Benefit outweighs risk ___  Insignificant ___  Low risk      ___ Not a true allergy  ___  Dose appropriate ___  Tolerated regimen in the past   If you prefer to order an alternate antibiotic please list drug, dose, route and time to administer below:     _____________________________________  _______    ___________   __________________  Antibiotic                                                                                        Dose                 Route                        Time of administration    ________________________________________Date____________Time________  (MD signature)  Fax this form back to [phone]

## (undated) NOTE — LETTER
[address] [place] [address] [address] [address]  [place] [address]  [facility]: [phone]  FAX: [phone]    06/12/23              To whom it may concern,       Shakira Gonzalez ( 9/25/1946) is under our care for the management of her xarelto. She should hold her Xarelto 2 days prior to procedure, and resume same day, or next day     according to provider preference. Please contact our office with any questions. Dr Shaggy Arguello.  Vin FOWLER OCN